# Patient Record
Sex: FEMALE | Race: WHITE
[De-identification: names, ages, dates, MRNs, and addresses within clinical notes are randomized per-mention and may not be internally consistent; named-entity substitution may affect disease eponyms.]

---

## 2017-08-01 ENCOUNTER — HOSPITAL ENCOUNTER (EMERGENCY)
Dept: HOSPITAL 22 - UTC | Age: 77
Discharge: HOME | End: 2017-08-01
Payer: MEDICARE

## 2017-08-01 VITALS — HEIGHT: 67 IN | BODY MASS INDEX: 32.49 KG/M2 | WEIGHT: 207 LBS

## 2017-08-01 VITALS — DIASTOLIC BLOOD PRESSURE: 67 MMHG | SYSTOLIC BLOOD PRESSURE: 128 MMHG

## 2017-08-01 DIAGNOSIS — I10: ICD-10-CM

## 2017-08-01 DIAGNOSIS — M79.651: Primary | ICD-10-CM

## 2017-08-01 NOTE — [facility] REPORT
History of Present Issue
Date/Time Seen by Provider 08/01/17 1957
Visit Reason
Pt arrived:Walked    
Presenting Problem:PT STATES HAVING TONYA HORSE TO OUTER LEFT THIGH ON 
WEDNESDAY. STATES GOING TO A RETREAT AND WALKING A LOT OVER THE WEEKEND WITHOUT 
DIFFICULTY. STATES TONYA HORSE LEFT A BRUISE TO HER THIGH THAT HAS NOW SPREAD 
DOWN HER LEG. STATES SWELLING TO LEG THAT BEGAN YESTERDAY.  
Location if Accident:
Onset of symptoms date/time:/ or onset unknown for:MEDICAL HX UNKNOWN
 
Have you (or family members/close friends) recently traveled outside the [place] 
[place]? N
If Yes, where/when: 
Have you had exposure to infectious disease within the past month?  TB? 
Other?  Specify: 
 
 
Patient states that she was at a walking retreat this weekend when she began to 
have pain and a tonya horse sensation in the top of her right thigh area 
states that next day it looked like a bruise. Sttes that she continued to walk 
throughout the weekend without any pain however the bruise appeared to moving 
down her leg into her knee area and then to her foot and her ankle area looks a 
little swollen today but no redness or temperature difference anywhere in the 
leg 
ALLERGIES
Coded Allergies:
Sulfa (Sulfonamide Antibiotics) (09/07/16)
ampicillin (09/07/16)
 
Home Medications
Active Scripts
CIPROFLOXACIN HCL (Ciprofloxacin HCl) 250 MG PO BID 
     #14 TAB 
     Prov:      10/07/16
 
Reported Medications
Aspirin (Adult Low Dose Aspirin EC) 81 MG PO DAILY 
DIGOXIN (Digox) 0.125 MG PO DAILY 
Rivaroxaban (Xarelto) 20 MG PO DAILY 
Metoprolol Tartrate (Metoprolol 25MG*****) 12.5 MG PO BID 
 
 
 
History
 
Medical History
General
   CAD? No
   Angina: No
   MI: No
   Hypertension? Yes
   Hyperlipidemia? No
   CHF? No
   DVT? No
   PE? No
   COPD? No
   Asthma? No
   Anemia? No
   GERD? No
   Gastric ulcers? No
   GI Bleed? No
   Hernia? No
   Thyroid Problems? No
   Hypothyroidism? No
   CVA? No
   Seizures? No
   Diabetes? No
   Renal Insuffiency? No
   UTI? No
   Stones? No
   BPH? No
   GB Disease: Yes
   Nephritic Syndrome? No
   Asplenia? No
   Hepatitis? No
   Sickle Cell Disease? No
   Arthritis? No
   Migraines? No
   Cataracts? No
   Glaucoma? No
   MRSA? No
   HIV? No
   TB? No
   Anxiety? No
   Depression? No
   Cancer? No
   More? Yes
   Additional hx:
AFIB, CYSTITIS
Immunization HX
   DT/Tetanus > 10 Years Ago
   Flu Refused
   Pneumonia Received In Past
Surgical Hx
Previous Surgery?Y  GALL BLADDER   Appendix   RIGHT KNEE REPLACEMENT   LEFT KNEE
REPLACEMENT
              
            
 
 
Family History
Family HX
   Diabetes Yes
   CAD Yes
   Hypertension Yes
   Hyperlipidemia Yes
   Cancer Yes
   TB No
 
Social History
Smoking Hx
   Smoker: Never Smoker
   Tobacco: No
Alcohol
   Alcohol: No
 
Review of Systems
All Other Systems Reviewed and Negative
 
Physical Exam
Vital Signs
 Vital Signs
 
 
Date Time Temp Pulse Resp B/P Pulse O2 O2 Flow FiO2
 
     Ox Delivery Rate 
 
08/01 1947 99.4 79 20 128/67 95   
 
 
General Appearance normal appearance, WD/WN, no apparent distress
Respiratory Status
Yes: trachea midline, chest symmetrical, non tender chest.  No: respiratory 
distress. 
Cardiovascular normal exam, regular rate/rhythm, no peripheral edema, no gallop
Extremities purplish looking contusion noted on right upper thigh area, knee and
lower calf and foot Patient states that bruising has traveled down the leg and 
denies injury Has history of varicose veins and spider veins. denies difficulty 
walking and thin shiny skin noted on lower extremities. Good pedal pulses, 
popiteal pulses, femoral pulses felt and good cap refill no redness, swollen 
areas or warmth noted to any area of skin
Neurologic alert, CNs II-XII nml as tested, normal exam, no motor/sensory 
deficits, oriented x 3
 
Medical Decision Making
 
LABS/Meds/Orders
Pt receiving controlled substance in ED? No
 
Departure
 
Departure
Time of Disposition 2025
Disposition DC Home or Self Care(routine)
Clinical Impression
Primary Impression: Hematoma
Condition STABLE
Referrals
Sukhdeep Ac MD (Family): [facility]
 
Patient Instructions DI for Hematoma (Bruise)
Additional Instructions
Call family doctor in the morning and see him tomorrow
If you began to have pain, redness, change of temp, swelling or any changes 
tonight return immediately to the ER
Return if needed
Discharge Counseling
   Counseled pt/family regarding diagnosis, home care, follow up needs
Comments
Patient given out pateint order for venous doppler and advised to see family 
doctor first thing in the morning. 
 
Electronically Signed by STEFANIE YORK on 08/01/17 at 2030

## 2017-08-04 NOTE — EXTERNAL MEDICAL SUMMARY RPT
[facility] Patient Summary
 Created on: 2017
 
MELISSA TOLLIVER
External Reference #: [id number]
: [birth year]
Sex: Female
 
Demographics
 
 
 
 Address  [address] [place] [address]
 
 Home Phone  [phone]
 
 Preferred Language  Unknown
 
 Marital Status  Unknown
 
 Voodoo Affiliation  Unknown
 
 Race  Unknown
 
 Ethnic Group  Unknown
 
 
Author
 
 
 
 Author  BOAL Heller, [employer] 
 
 Organization  [employer]
 
 Address  Unknown
 
 Phone  Unavailable
 
 
 
Results
 
 
 
 CBC W Auto Differential panel in Blood
 
 
 
 
 Observa  Value  Referen  Units  Interpr  Notes  Date
 
 tion   ce    etation  
 
   Range    
 
 
 
 
 Basophils  0 - 0.2  K/MM3  Normal  No   Aug 3 
 
       inform2017 
 
 [#/volume     on in   10:55 AM
 
 ] in      source  
 
 Blood by      data 
 
 Automated     
 
  count     
 
 Basophils  0.1 - 2.0  %  Normal  No   Aug 3 
 
 /      inform2017 
 
 leukocyte     on in   10:55 AM
 
 s in      source  
 
 Blood by      data 
 
 Automated     
 
  count     
 
 Eosinophi  0.0 - 0.4  K/mm3  Normal  No   Aug 3 
 
 ls      inform2017 
 
 [#/volume     on in   10:55 AM
 
 ] in      source  
 
 Blood by      data 
 
 Automated     
 
  count     
 
 Eosinophi  0.1 -   %  Normal  No   Aug 3 
 
 ls/100   12.0    inform2017 
 
 leukocyte     on in   10:55 AM
 
 s in      source  
 
 Blood by      data 
 
 Automated     
 
  count     
 
 Granulocy  1.8 - 7.8  K/mm3  Normal  No   Aug 3 
 
 jan      2017 
 
 [#/volume     on in   10:55 AM
 
 ] in      source  
 
 Blood by      data 
 
 Automated     
 
  count     
 
 Granulocy  37.0 -   %  Normal  No   Aug 3 
 
 jan/100   80.0    2017 
 
 leukocyte     on in   10:55 AM
 
 s in      source  
 
 Blood by      data 
 
 Automated     
 
  count     
 
 Hematocri  37.0 -   %  Normal  No   Aug 3 
 
 t [Volume  47.0    2017 
 
       on in   10:55 AM
 
 Fraction]     source  
 
  of Blood     data 
 
 Hemoglobi  12.2 -   g/dL  No   No   Aug 3 
 
 n   16.2   informati  informati   
 
 [Mass/vol    on in   on in   10:55 AM
 
 ume] in     source   source  
 
 Blood    data  data 
 
 Lymphocyt  0.7 - 4.5  K/mm3  Low  No   Aug 3 
 
 es      inform2017 
 
 [#/volume     on in   10:55 AM
 
 ] in      source  
 
 Unspecifi     data 
 
 ed      
 
 specimen      
 
 by      
 
 Automated     
 
  count     
 
 Lymphocyt  10 - 50.0  %  Normal  No   Aug 3 
 
 es      inform2017 
 
 [#/volume     on in   10:55 AM
 
 ] in      source  
 
 Unspecifi     data 
 
 ed      
 
 specimen      
 
 by      
 
 Automated     
 
  count     
 
 Erythrocy  27 - 31.2  pg  High  No   Aug 3 
 
 te mean      informati  2017 
 
 corpuscul     on in   10:55 AM
 
 ar      source  
 
 hemoglobi     data 
 
 n      
 
 [Entitic      
 
 mass]     
 
 Erythrocy  31.8 -   g/dl  Normal  No   Aug 3 
 
 te mean   35.4    2017 
 
 corpuscul     on in   10:55 AM
 
 ar      source  
 
 hemoglobi     data 
 
 n      
 
 concentra     
 
 tion      
 
 [Mass/vol     
 
 ume] by      
 
 Automated     
 
  count     
 
 Erythrocy  82.2 -   fl  High  No   Aug 3 
 
 te mean   97.8    2017 
 
 corpuscul     on in   10:55 AM
 
 ar volume     source  
 
  [Entitic     data 
 
  volume]      
 
 by      
 
 Automated     
 
  count     
 
 Monocytes  0.1 - 1.0  K/mm3  Normal  No   Aug 3 
 
       informati  2017 
 
 [#/volume     on in   10:55 AM
 
 ] in      source  
 
 Blood by      data 
 
 Automated     
 
  count     
 
 Monocytes  1.7 - 9.3  %  Normal  No   Aug 3 
 
 /100      2017 
 
 leukocyte     on in   10:55 AM
 
 s in      source  
 
 Blood by      data 
 
 Automated     
 
  count     
 
 Platelet   7.4 -   fl  High  No   Aug 3 
 
 mean   10.4    2017 
 
 volume      on in   10:55 AM
 
 [Entitic      source  
 
 volume]      data 
 
 in Blood      
 
 by      
 
 Automated     
 
  count     
 
 Platelets  142 - 424  K/mm3  Low  No   Aug 3 
 
       informati  2017 
 
 [#/volume     on in   10:55 AM
 
 ] in      source  
 
 Blood     data 
 
 Erythrocy  4.2 - 5.4  M/mm3  Low  No   Aug 3 
 
 jan      2017 
 
 [#/volume     on in   10:55 AM
 
 ] in      source  
 
 Amniotic      data 
 
 fluid     
 
 Erythrocy  11.5 -   %  Normal  No   Aug 3 
 
 te   17.5    2017 
 
 distribut     on in   10:55 AM
 
 ion width     source  
 
  [Entitic     data 
 
  volume]      
 
 by      
 
 Automated     
 
  count     
 
 Leukocyte  4.8 -   K/MM3  Low  No   Aug 3 
 
 s   10.8    2017 
 
 [#/volume     on in   10:55 AM
 
 ] in      source  
 
 Blood     data 
 
 
 
 
 Basic metabolic panel in Blood
 
 
 
 
 Observa  Value  Referen  Units  Interpr  Notes  Date
 
 tion   ce    etation  
 
   Range    
 
 
 
 
 Urea   7 - 18  mg/dL  Normal  No   Aug 3 
 
 nitrogen      2017 
 
 [Mass/vol     on in   10:55 AM
 
 ume] in      source  
 
 Serum or      data 
 
 Plasma     
 
 Calcium   8.5 -   mg/dL  Normal  No   Aug 3 
 
 [Mass/vol  10.1    2017 
 
 ume] in      on in   10:55 AM
 
 Serum or      source  
 
 Plasma     data 
 
 Chloride   98 - 107  mmoL/L  High  No   Aug 3 
 
 [Moles/vo     2017 
 
 lume] in      on in   10:55 AM
 
 Serum or      source  
 
 Plasma     data 
 
 Carbon   21.0 -   mmoL/L  Normal  No   Aug 3 
 
 dioxide,   32.0    informati   
 
 total      on in   10:55 AM
 
 [Moles/vo     source  
 
 lume] in      data 
 
 Serum or      
 
 Plasma     
 
 Creatinin  0.55 -   mg/dL  Normal  No   Aug 3 
 
 e   1.02    inform2017 
 
 [Mass/vol     on in   10:55 AM
 
 ume] in      source  
 
 Serum or      data 
 
 Plasma     
 
 Creatinin  50 - 200  ML/MIN  Normal  No   Aug 3 
 
 e renal      informati   
 
 clearance     on in   10:55 AM
 
       source  
 
 predicted     data 
 
  by      
 
 Cockcroft     
 
 -Gault      
 
 formula     
 
 Estimated  59-  ML/MIN  No   REFERENCE  Aug 3 
 
      informati   RANGE:   2017 
 
 glomerula    on in   >60   10:55 AM
 
 r     source   ML/MIN/1. 
 
 filtratio    data  73 SQUARE 
 
 n rate       METERSIf 
 
 (GF      this  
 
     patient  
 
     is  
 
     -A 
 
     merican,  
 
     then  
 
     multiply  
 
     theresult 
 
      by  
 
     1.210. 
 
 Glucose   74 - 106  mg/dL  High  No   Aug 3 
 
 [Mass/vol     informati   
 
 ume] in      on in   10:55 AM
 
 Serum or      source  
 
 Plasma     data 
 
 Potassium  3.5 - 5.1  mmoL/L  Normal  No   Aug 3 
 
       informati  2017 
 
 [Moles/vo     on in   10:55 AM
 
 lume] in      source  
 
 Serum or      data 
 
 Plasma     
 
 Sodium   136 - 145  mmoL/L  Normal  No   Aug 3 
 
 [Moles/vo     informati   
 
 lume] in      on in   10:55 AM
 
 Serum or      source  
 
 Plasma     data

## 2017-08-04 NOTE — EXTERNAL MEDICAL SUMMARY RPT
Patient Summary
 Created on: 2017
 
TOLLIVER MELISSA
: [birth year]
Sex: Female
 
Demographics
 
 
 
 Preferred Language  English
 
 Marital Status  Unknown
 
 Islam Affiliation  Unknown
 
 Race  Unknown
 
 Ethnic Group  Unknown
 
 
Author
 
 
 
 Author            ,            [employer]
 
 Address  Unknown
 
 Phone  [email or web address]
 
                                                                
 
Immunization
                                    Unable to retrieve immunization data due to 
connection failure with Immunization Registry. Please try again later.

## 2017-08-04 NOTE — EXTERNAL MEDICAL SUMMARY RPT
[facility] Patient Summary
 Created on: 2017
 
MELISSA TOLLIVER
External Reference #: [id number]
: [birth year]
Sex: Female
 
Demographics
 
 
 
 Address  [address] [place] [address]
 
 Home Phone  [phone]
 
 Preferred Language  Unknown
 
 Marital Status  Unknown
 
 Adventism Affiliation  Unknown
 
 Race  Unknown
 
 Ethnic Group  Unknown
 
 
Author
 
 
 
 Author  [employer], [employer] 
 
 Organization  [employer]
 
 Address  Unknown
 
 Phone  Unavailable
 
 
 
Results
 
 
 
 CBC W Auto Differential panel in Blood
 
 
 
 
 Observa  Value  Referen  Units  Interpr  Notes  Date
 
 tion   ce    etation  
 
   Range    
 
 
 
 
 Basophils  0 - 0.2  K/MM3  Normal  No   Aug 3 
 
       inform2017 
 
 [#/volume     on in   10:55 AM
 
 ] in      source  
 
 Blood by      data 
 
 Automated     
 
  count     
 
 Basophils  0.1 - 2.0  %  Normal  No   Aug 3 
 
 /      inform2017 
 
 leukocyte     on in   10:55 AM
 
 s in      source  
 
 Blood by      data 
 
 Automated     
 
  count     
 
 Eosinophi  0.0 - 0.4  K/mm3  Normal  No   Aug 3 
 
 ls      inform2017 
 
 [#/volume     on in   10:55 AM
 
 ] in      source  
 
 Blood by      data 
 
 Automated     
 
  count     
 
 Eosinophi  0.1 -   %  Normal  No   Aug 3 
 
 ls/100   12.0    inform2017 
 
 leukocyte     on in   10:55 AM
 
 s in      source  
 
 Blood by      data 
 
 Automated     
 
  count     
 
 Granulocy  1.8 - 7.8  K/mm3  Normal  No   Aug 3 
 
 jan      2017 
 
 [#/volume     on in   10:55 AM
 
 ] in      source  
 
 Blood by      data 
 
 Automated     
 
  count     
 
 Granulocy  37.0 -   %  Normal  No   Aug 3 
 
 jan/100   80.0    2017 
 
 leukocyte     on in   10:55 AM
 
 s in      source  
 
 Blood by      data 
 
 Automated     
 
  count     
 
 Hematocri  37.0 -   %  Normal  No   Aug 3 
 
 t [Volume  47.0    2017 
 
       on in   10:55 AM
 
 Fraction]     source  
 
  of Blood     data 
 
 Hemoglobi  12.2 -   g/dL  No   No   Aug 3 
 
 n   16.2   informati  informati   
 
 [Mass/vol    on in   on in   10:55 AM
 
 ume] in     source   source  
 
 Blood    data  data 
 
 Lymphocyt  0.7 - 4.5  K/mm3  Low  No   Aug 3 
 
 es      inform2017 
 
 [#/volume     on in   10:55 AM
 
 ] in      source  
 
 Unspecifi     data 
 
 ed      
 
 specimen      
 
 by      
 
 Automated     
 
  count     
 
 Lymphocyt  10 - 50.0  %  Normal  No   Aug 3 
 
 es      inform2017 
 
 [#/volume     on in   10:55 AM
 
 ] in      source  
 
 Unspecifi     data 
 
 ed      
 
 specimen      
 
 by      
 
 Automated     
 
  count     
 
 Erythrocy  27 - 31.2  pg  High  No   Aug 3 
 
 te mean      informati  2017 
 
 corpuscul     on in   10:55 AM
 
 ar      source  
 
 hemoglobi     data 
 
 n      
 
 [Entitic      
 
 mass]     
 
 Erythrocy  31.8 -   g/dl  Normal  No   Aug 3 
 
 te mean   35.4    2017 
 
 corpuscul     on in   10:55 AM
 
 ar      source  
 
 hemoglobi     data 
 
 n      
 
 concentra     
 
 tion      
 
 [Mass/vol     
 
 ume] by      
 
 Automated     
 
  count     
 
 Erythrocy  82.2 -   fl  High  No   Aug 3 
 
 te mean   97.8    2017 
 
 corpuscul     on in   10:55 AM
 
 ar volume     source  
 
  [Entitic     data 
 
  volume]      
 
 by      
 
 Automated     
 
  count     
 
 Monocytes  0.1 - 1.0  K/mm3  Normal  No   Aug 3 
 
       informati  2017 
 
 [#/volume     on in   10:55 AM
 
 ] in      source  
 
 Blood by      data 
 
 Automated     
 
  count     
 
 Monocytes  1.7 - 9.3  %  Normal  No   Aug 3 
 
 /100      2017 
 
 leukocyte     on in   10:55 AM
 
 s in      source  
 
 Blood by      data 
 
 Automated     
 
  count     
 
 Platelet   7.4 -   fl  High  No   Aug 3 
 
 mean   10.4    2017 
 
 volume      on in   10:55 AM
 
 [Entitic      source  
 
 volume]      data 
 
 in Blood      
 
 by      
 
 Automated     
 
  count     
 
 Platelets  142 - 424  K/mm3  Low  No   Aug 3 
 
       informati  2017 
 
 [#/volume     on in   10:55 AM
 
 ] in      source  
 
 Blood     data 
 
 Erythrocy  4.2 - 5.4  M/mm3  Low  No   Aug 3 
 
 jan      2017 
 
 [#/volume     on in   10:55 AM
 
 ] in      source  
 
 Amniotic      data 
 
 fluid     
 
 Erythrocy  11.5 -   %  Normal  No   Aug 3 
 
 te   17.5    2017 
 
 distribut     on in   10:55 AM
 
 ion width     source  
 
  [Entitic     data 
 
  volume]      
 
 by      
 
 Automated     
 
  count     
 
 Leukocyte  4.8 -   K/MM3  Low  No   Aug 3 
 
 s   10.8    2017 
 
 [#/volume     on in   10:55 AM
 
 ] in      source  
 
 Blood     data 
 
 
 
 
 Basic metabolic panel in Blood
 
 
 
 
 Observa  Value  Referen  Units  Interpr  Notes  Date
 
 tion   ce    etation  
 
   Range    
 
 
 
 
 Urea   7 - 18  mg/dL  Normal  No   Aug 3 
 
 nitrogen      2017 
 
 [Mass/vol     on in   10:55 AM
 
 ume] in      source  
 
 Serum or      data 
 
 Plasma     
 
 Calcium   8.5 -   mg/dL  Normal  No   Aug 3 
 
 [Mass/vol  10.1    2017 
 
 ume] in      on in   10:55 AM
 
 Serum or      source  
 
 Plasma     data 
 
 Chloride   98 - 107  mmoL/L  High  No   Aug 3 
 
 [Moles/vo     2017 
 
 lume] in      on in   10:55 AM
 
 Serum or      source  
 
 Plasma     data 
 
 Carbon   21.0 -   mmoL/L  Normal  No   Aug 3 
 
 dioxide,   32.0    informati   
 
 total      on in   10:55 AM
 
 [Moles/vo     source  
 
 lume] in      data 
 
 Serum or      
 
 Plasma     
 
 Creatinin  0.55 -   mg/dL  Normal  No   Aug 3 
 
 e   1.02    inform2017 
 
 [Mass/vol     on in   10:55 AM
 
 ume] in      source  
 
 Serum or      data 
 
 Plasma     
 
 Creatinin  50 - 200  ML/MIN  Normal  No   Aug 3 
 
 e renal      informati   
 
 clearance     on in   10:55 AM
 
       source  
 
 predicted     data 
 
  by      
 
 Cockcroft     
 
 -Gault      
 
 formula     
 
 Estimated  59-  ML/MIN  No   REFERENCE  Aug 3 
 
      informati   RANGE:   2017 
 
 glomerula    on in   >60   10:55 AM
 
 r     source   ML/MIN/1. 
 
 filtratio    data  73 SQUARE 
 
 n rate       METERSIf 
 
 (GF      this  
 
     patient  
 
     is  
 
     -A 
 
     merican,  
 
     then  
 
     multiply  
 
     theresult 
 
      by  
 
     1.210. 
 
 Glucose   74 - 106  mg/dL  High  No   Aug 3 
 
 [Mass/vol     informati   
 
 ume] in      on in   10:55 AM
 
 Serum or      source  
 
 Plasma     data 
 
 Potassium  3.5 - 5.1  mmoL/L  Normal  No   Aug 3 
 
       informati  2017 
 
 [Moles/vo     on in   10:55 AM
 
 lume] in      source  
 
 Serum or      data 
 
 Plasma     
 
 Sodium   136 - 145  mmoL/L  Normal  No   Aug 3 
 
 [Moles/vo     informati   
 
 lume] in      on in   10:55 AM
 
 Serum or      source  
 
 Plasma     data

## 2017-08-04 NOTE — EXTERNAL MEDICAL SUMMARY RPT
Patient Summary
 Created on: 2017
 
TOLLIVER MELISSA
: [birth year]
Sex: Female
 
Demographics
 
 
 
 Preferred Language  English
 
 Marital Status  Unknown
 
 Rastafarian Affiliation  Unknown
 
 Race  Unknown
 
 Ethnic Group  Unknown
 
 
Author
 
 
 
 Author            ,            [employer]
 
 Address  Unknown
 
 Phone  [email or web address]
 
                                                                
 
Immunization
                                    Unable to retrieve immunization data due to 
connection failure with Immunization Registry. Please try again later.

## 2017-08-04 NOTE — EXTERNAL MEDICAL SUMMARY RPT
Patient Summary
 Created on: 2017
 
TOLLIVERMELISSA
External Reference #: [id number]
: [birth year]
Sex: Female
 
Demographics
 
 
 
 Address  [address] [place] [address] [place] [facility]
 
 Home Phone  [phone]
 
 Preferred Language  Unknown
 
 Marital Status  Unknown
 
 Rastafarian Affiliation  Unknown
 
 Race  White
 
 Ethnic Group  Unknown
 
 
Author
 
 
 
 Author            ,            BOLA [employer]
 
 Address  Unknown
 
 Phone  [email or web address]
 
 
 
Support
 
 
 
 Name  Relationship  Address  Phone
 
 BACK,            Next Of Kin  [address]   +1 
 
   YADIRA NICOLAS   [phone]
 
   [facility]  
 
   [facility] 
 
                                            
 
Purpose
                      Continuity of Care Document - 2013 through 2017                                                                            
                     
 
Problems
                      
 
 
 Code         Diagnosis    DOS          Provider     Status     
 
                                                               
 
               
 
 E04.1        NONTOXIC                                        
 
              SINGLE                                        
 
    THYROID                                        
 
  NODULE                  
 
                
 
       
 
                                                                                
                 
 
Allergies, Adverse Reactions, Alerts
                      
 
 
 Type                
 
 Drug Allergy                
 
            Adverse Reaction to Substance            
 
 
 Substance              Reaction               Severity             
 
                   
 
 Ampicillin             I-HIVES                Intermediate         
 
                       
 
                                                                                
                 
 
Medications
                      
 
 
 Na  ND  Rx  Da  Fi  Fi  Am  Da  Di  Ph  RX  Ph  St
 
 me  C   No  te  ll  ll  ou  ys  ag  ar   #  ys  at
 
         rm        s   nt      no  ma      ic  us
 
             Or  Da              si  cy      ia    
 
             de  te              s           n     
 
             re                                    
 
             d                                     
 
                                                   
 
                                                   
 
                                                   
 
                                                  
 
                                   
 
                           
 
                      
 
               
 
              
 
 Sa  63          11          0                   No
 
 li  80          -1                               
 
 ne  70          8-                              Lo
 
    10          20                              ng
 
 Fl  07          13                              er
 
 us  5                                            
 
 h                                               Ac
 
 10                                              ti
 
 ML                                              ve
 
                                                
 
 Sy                                          
 
 ri                                          
 
 ng                                          
 
 e                                           
 
                                         
 
                                       
 
                                       
 
                                    
 
               
 
               
 
               
 
               
 
              
 
 KE  00          11          0                   No
 
 TO  40          -1                               
 
 RO  93          8-                              Lo
 
 LA  79          20                              ng
 
 C   50          13                              er
 
 30  1                                            
 
                                                Ac
 
 MG                                              ti
 
 /M                                              ve
 
 L                                               
 
 VI                                          
 
 AL                                          
 
                                             
 
                                             
 
                                         
 
                                       
 
                                       
 
                                    
 
               
 
               
 
               
 
 AC  51          11          0                   No
 
 ET  07          -1                               
 
 AM  90          8-                              Lo
 
 IN  16          20                              ng
 
 OP  19          13                              er
 
 HE  9H                                           
 
 N                                               Ac
 
 W/                                              ti
 
 CO                                              ve
 
 DE                                              
 
 IN                                          
 
 E                                           
 
 #3                                          
 
                                            
 
 TA                                       
 
 K                                     
 
                                       
 
                                    
 
               
 
               
 
               
 
               
 
               
 
               
 
              
 
 CE  00          11          0                   No
 
 FT  40          -1                               
 
 RI  97          8-                              Lo
 
 AX  33          20                              ng
 
 ON  30          13                              er
 
 E   4                                            
 
 1                                               Ac
 
 GM                                              ti
 
                                                ve
 
 VI                                              
 
 AL                                          
 
                                             
 
                                             
 
                                             
 
                                         
 
                                       
 
                                       
 
                                    
 
               
 
               
 
 SO  00          11          0                   No
 
 DI  40          -1                               
 
 UM  97          8-                              Lo
 
    10          20                              ng
 
 CH  16          13                              er
 
 LO  6                                            
 
 RI                                              Ac
 
 DE                                              ti
 
                                                ve
 
 0.                                              
 
 9%                                          
 
                                            
 
 SO                                          
 
 LN                                          
 
                                         
 
                                       
 
                                       
 
                                    
 
               
 
               
 
               
 
               
 
               
 
                                                                                
                                                                   
 
Vital Signs
                      2013 05:43            
 
 
 Name         Value        Interpretat  Reference   Comment    
 
                           ion          Range                   
 
                               
 
      
 
 BP   79 mm[Hg]                                         
 
 Diastolic                                                      
 
                                                          
 
                    
 
 BP Systolic  144 mm[Hg]                                        
 
                                                                
 
                                                       
 
              
 
 Heart   68 /min                                           
 
 Rate/Pulse                                                     
 
                                                        
 
                     
 
 O2%          96 %                                              
 
                                                               
 
                                        
 
              
 
 Respiratory  20 /min                                           
 
  Rate                                                          
 
                                                        
 
                
 
            2013 05:11            
 
 
 Name         Value        Interpretat  Reference   Comment    
 
                           ion          Range                   
 
                               
 
      
 
 BP   73 mm[Hg]                                         
 
 Diastolic                                                      
 
                                                          
 
                    
 
 BP Systolic  146 mm[Hg]                                        
 
                                                                
 
                                                       
 
              
 
 Heart   59 /min                                           
 
 Rate/Pulse                                                     
 
                                                        
 
                     
 
 O2%          97 %                                              
 
                                                               
 
                                        
 
              
 
 Respiratory  20 /min                                           
 
  Rate                                                          
 
                                                        
 
                
 
                                                                                
                           
 
Results
                      
 
 
 Labs                
 
 
 
 
 Lab   Lab   Date    Result  Refere  Interp  Status  Commen
 
 Order   Detail                  nces   retati          t     
 
                                 Range   on                    
 
                                                            
 
                                  
 
                
 
 
 
 
 Phosphate SerPl-mCnc (2017 13:12)
 
                 
 
 
 
 
          Phospha  -2  3.5   2.5-4.5           complet
 
          te   017   mg/dL                      ed     
 
        SerPl-m  13:12                                      
 
  Cnc                                           
 
                                 
 
                   
 
 
 
 
 Ca-I SerPl ISE-sCnc (2017 13:12)
 
                 
 
 
 
 
          Ca-I   -2  5.0   4.6-5.3           complet
 
          SerPl   017   mg/dL                      ed     
 
        ISE-sCn  13:12                                      
 
  c                                             
 
                                 
 
                 
 
 
 
 
 Ca-I SerPl ISE-sCnc (2017 14:39)
 
                 
 
 
 
 
          Ca-I   --2  4.5   4.6-5.1           complet
 
          SerPl   017   mg/dL                      ed     
 
        ISE-sCn  14:39                                      
 
  c                                             
 
                                 
 
                 
 
 
 
 
 Phosphate SerPl-mCnc (2017 04:08)
 
                 
 
 
 
 
          Phospha  --2  3.7   2.5-4.5           complet
 
          te   017   mg/dL                      ed     
 
        SerPl-m  04:08                                      
 
  Cnc                                           
 
                                 
 
                   
 
 
 
 
 Ca-I SerPl ISE-sCnc (2017 04:08)
 
                 
 
 
 
 
          Ca-I   --2  4.3   4.6-5.1           complet
 
          SerPl   017   mg/dL                      ed     
 
        ISE-sCn  04:08                                      
 
  c                                             
 
                                 
 
                 
 
 
 
 
 Magnesium SerPl-mCnc (2017 04:08)
 
                 
 
 
 
 
          Magnesi  -2  2.0   1.9-2.4           complet
 
          um   017   mg/dL                      ed     
 
        SerPl-m  04:08                                      
 
  Cnc                                           
 
                                 
 
                   
 
 
 
 
 Phosphate SerPl-mCnc (2017 16:43)
 
                 
 
 
 
 
          Phospha  -2  3.7   2.5-4.5           complet
 
          te   017   mg/dL                      ed     
 
        SerPl-m  16:43                                      
 
  Cnc                                           
 
                                 
 
                   
 
 
 
 
 Magnesium SerPl-mCnc (2017 16:43)
 
                 
 
 
 
 
          Magnesi  --2  1.8   1.9-2.4           complet
 
          um   017   mg/dL                      ed     
 
        SerPl-m  16:43                                      
 
  Cnc                                           
 
                                 
 
                   
 
 
 
 
 Ca-I SerPl ISE-sCnc (2017 16:43)
 
                 
 
 
 
 
          Ca-I   06--2  4.4   4.6-5.1           complet
 
          SerPl   017   mg/dL                      ed     
 
        ISE-sCn  16:43                                      
 
  c                                             
 
                                 
 
                 
 
 
 
 
 BASIC METABOLIC PANEL (2013 05:05)
 
                 
 
 
 
 
          Glucose    110               complet
 
             013   mg/dL                      ed     
 
        Bld-mCn  05:05                                      
 
  c                                            
 
                                 
 
                 
 
          BUN   11-18-2  17   7-18              complet
 
          Bld-mCn  013   mg/dL                      ed     
 
        c        05:05                                      
 
                                             
 
                               
 
           
 
          Creat   11-18-2  1.0   0.6-1.0           complet
 
          SerPl-m  013   mg/dL                      ed     
 
        Cnc      05:05                                      
 
                                                
 
                                 
 
           
 
          ESTIMAT  11-18-2  74               complet
 
          ED   013   ML/MIN                     ed     
 
        CREATIN  05:05                                      
 
  INE                                       
 
  CLEARAN                         
 
  CE               
 
              
 
             
 
          GFR   11-18-2  55   59-               complet
 
          (ESTIMA  013   ML/MIN                     ed     
 
        BOWEN)     05:05                                      
 
                                           
 
                                  
 
             
 
          Sodium   11-18-2  141   136-145           complet
 
          SerPl-s  013   mmoL/L                     ed     
 
        Cnc      05:05                                      
 
                                                
 
                                  
 
           
 
          Potassi  11-18-2  4.1   3.5-5.1           complet
 
          um   013   mmoL/L                     ed     
 
        SerPl-s  05:05                                      
 
  Cnc                                           
 
                                  
 
                   
 
          Chlorid  11-18-2  104               complet
 
          e   013   mmoL/L                     ed     
 
        SerPl-s  05:05                                      
 
  Cnc                                          
 
                                  
 
                   
 
          CO2   11-18-2  29   21.0-32           complet
 
          SerPl-s  013   mmoL/L   .0                ed     
 
        Cnc      05:05                                      
 
                                                  
 
                                  
 
           
 
          Calcium  11-18-2  8.9   8.5-10.           complet
 
             013   mg/dL    1                 ed     
 
        SerPl-m  05:05                                      
 
  Cnc                                            
 
                                 
 
                   
 
 
 
 
 CBC with AUTO DIFF (2013 05:05)
 
                 
 
 
 
 
          WBC #   11-18-2  9.1   4.8-10.           complet
 
          Bld   013   K/MM3    8                 ed     
 
        Auto     05:05                                      
 
                                                 
 
                                 
 
             
 
          RBC #   11-18-2  4.52   4.2-5.4           complet
 
          Bld   013   M/mm3                      ed     
 
        Auto     05:05                                      
 
                                                
 
                                 
 
             
 
          Hgb   11-18-2  15.5   12.2-16           complet
 
          Bld-mCn  013   g/dL     .2                ed     
 
        c        05:05                                      
 
                                                  
 
                              
 
           
 
          Hct Fr   11-18-2  44.4 %   37.0-47           complet
 
          Bld      013            .0                ed     
 
                 05:05                                      
 
                                              
 
                      
 
           
 
          MCV RBC  11-18-2  98.3 fl  82.2-97           complet
 
                   013            .8                ed     
 
                 05:05                                      
 
                                            
 
                      
 
           
 
          MCH RBC  11-18-2  34.3 pg  27-31.2           complet
 
           Qn   013                              ed     
 
        Auto     05:05                                      
 
                                             
 
                              
 
             
 
          MEAN   11-18-2  34.9   31.8-35           complet
 
          CORPUSC  013   g/dl     .4                ed     
 
        ULAR   05:05                                      
 
  HGB                                          
 
  CONC                          
 
                   
 
              
 
        
 
          RDW RBC  11-18-2  14.6 %   11.5-17           complet
 
           Auto    013            .5                ed     
 
                 05:05                                      
 
                                              
 
                         
 
           
 
          Platele  11-18-2  119   142-424           complet
 
          t Bld   013   K/mm3                      ed     
 
        Ql   05:05                                      
 
  Manual                                        
 
                                 
 
                   
 
          
 
          MEAN   11-18-2  8.7 fl   7.4-10.           complet
 
          PLATELE  013            4                 ed     
 
        T   05:05                                      
 
  VOLUME                                     
 
                              
 
                   
 
          
 
          Granulo  11-18-2  78.8 %   37.0-80           complet
 
          cytes   013            .0                ed     
 
        Fr Bld   05:05                                      
 
  Auto                                        
 
                              
 
                   
 
        
 
          LYMPH %  11-18-2  13.3 %   10-50.0           complet
 
                   013                              ed     
 
                 05:05                                      
 
                                         
 
                      
 
           
 
          Monocyt  11-18-2  6.0 %    1.7-9.3           complet
 
          es Fr   013                              ed     
 
        Bld   05:05                                      
 
  Auto                                     
 
                              
 
                   
 
        
 
          Eosinop  11-18-2  1.6 %    0.1-12.           complet
 
          hil Fr   013            0                 ed     
 
        Bld   05:05                                      
 
  Auto                                      
 
                              
 
                   
 
        
 
          Basophi  11-18-2  0.4 %    0.1-2.0           complet
 
          ls Fr   013                              ed     
 
        Bld   05:05                                      
 
  Auto                                     
 
                              
 
                   
 
        
 
          Granulo  11-18-2  7.2   1.8-7.8           complet
 
          cytes #  013   K/mm3                      ed     
 
         Bld   05:05                                      
 
  Auto                                          
 
                                 
 
                   
 
        
 
          Lymphoc  11-18-2  1.2   0.7-4.5           complet
 
          ytes Fr  013   K/mm3                      ed     
 
         Bld   05:05                                      
 
  Auto                                          
 
                                 
 
                   
 
        
 
          Monocyt  11-18-2  0.5   0.1-1.0           complet
 
          es #   013   K/mm3                      ed     
 
        Bld   05:05                                      
 
  Auto                                          
 
                                 
 
                   
 
        
 
          Eosinop  11-18-2  0.1   0.0-0.4           complet
 
          hil #   013   K/mm3                      ed     
 
        Bld   05:05                                      
 
  Auto                                          
 
                                 
 
                   
 
        
 
          Basophi  11-18-2  0.0   0-0.2             complet
 
          ls #   013   K/MM3                      ed     
 
        Bld   05:05                                      
 
  Auto                                        
 
                                 
 
                   
 
        
 
 
 
 
 URINALYSIS/COMPLETE (2013 04:35)
 
                 
 
 
 
 
          URINE   11-18-2  ORANGE   YELLOW            complet
 
          COLOR    013                              ed     
 
                 04:35                                      
 
                                           
 
                         
 
           
 
          URINE   11-18-2  CLOUDY   CLEAR             complet
 
          APPEARA  013                              ed     
 
        NCE      04:35                                      
 
                                          
 
                              
 
           
 
          URINE   11-18-2  TRACE    NEG               complet
 
          GLUCOSE  013                              ed     
 
         -   04:35                                      
 
  DIPSTIC                             
 
  K                           
 
                   
 
            
 
          URINE   11-18-2  NEGATIV  NEG               complet
 
          BILIRUB  013   E                          ed     
 
        IN -   04:35                                      
 
  DIPSTIC                                
 
  K                           
 
                   
 
            
 
          URINE   11-18-2  NEGATIV  NEG               complet
 
          KETONE   013   E mg/dL                    ed     
 
                 04:35                                      
 
                                           
 
                               
 
           
 
          URINE   11-18-2  1.010   1.005-1           complet
 
          SPECIFI  013   UNK      .030              ed     
 
        C   04:35                                      
 
  GRAVITY                                          
 
                                
 
                   
 
           
 
          URINE   11-18-2  2+       NEG               complet
 
          BLOOD    013                              ed     
 
                 04:35                                     
 
                                   
 
                         
 
           
 
          URINE   11-18-2  7.0 UNK  5.0-8.5           complet
 
          PH       013                              ed     
 
                 04:35                                      
 
                                            
 
                      
 
           
 
          URINE   18-2  2+   NEG               complet
 
          PROTEIN  013   mg/dL                      ed     
 
         -   04:35                                      
 
  DIPSTIC                                  
 
  K                              
 
                   
 
            
 
          URINE   -18-2  2.0   NEG               complet
 
          UROBILI  013   E.U./dL                    ed     
 
        NOGEN -  04:35                                      
 
                                     
 
  DIPSTIC                          
 
  K                
 
              
 
            
 
          URINE   18-2  POSITIV  NEG               complet
 
          NITRATE  013   E                          ed     
 
         -   04:35                                      
 
  DIPSTIC                                
 
  K                           
 
                   
 
            
 
          URINE   -2  3+       NEG               complet
 
          LEUK   013                              ed     
 
        ESTERAS  04:35                                     
 
  E                                
 
                              
 
                 
 
          URINE   -2  10-20   0                 complet
 
          RBC      013   rbc/hpf                    ed     
 
                 04:35                                    
 
                                           
 
                           
 
           
 
          URINE   18-2  TNTC   O                 complet
 
          WBC      013   wbc/hpf                    ed     
 
                 04:35                                    
 
                                           
 
                           
 
           
 
                                                                                
                                                                                
                                                                                
                                                                                
                                                                                
                                                                                
                                                                                
                                                   
 
Encounters
                      
 
 
 Encounter  Start   End Date   Code       Location   Performer
 
  Type      Date                                                 
 
                                                        
 
                
 
 Emergency    ERIK Rodas MD
 
 (ER)       3 04:42    3 05:44               [facility]

## 2017-12-06 ENCOUNTER — HOSPITAL ENCOUNTER (OUTPATIENT)
Dept: HOSPITAL 22 - RAD | Age: 77
End: 2017-12-06
Attending: OBSTETRICS & GYNECOLOGY
Payer: MEDICARE

## 2017-12-06 DIAGNOSIS — Z12.31: Primary | ICD-10-CM

## 2017-12-06 PROCEDURE — G0202 SCR MAMMO BI INCL CAD: HCPCS

## 2017-12-08 NOTE — RADIOLOGY REPORT PS360
DIG MAMM-SCREEN CHARLENE W/CAD
CAD Screening 
 
COMPARISON:  Digital mammograms 7/12/2016 and 11/12/2014
 
INDICATION:  There is no personal or family history of breast cancer
 
TECHNIQUE:  Standard CC and MLO images were obtained.  R2 CAD reviewed.  
 
FINDINGS:  Diffuse fibroglandular densities are seen throughout both breasts.
Again noted are multiple benign-appearing micro and macrocalcifications in each
breast. There is no suspicious lesion and there are no suspicious
microcalcifications.
 
 
 
IMPRESSION: Stable exam with no suspicious lesion seen recommend yearly
follow-up 
 
 
BI-RADS CATEGORY: 2_Benign
 
RECOMMENDED FOLLOWUP: 12M 12 MONTH FOLLOW-UP
 
(A letter has been sent to the patient regarding results of the study.)

## 2018-09-12 ENCOUNTER — HOSPITAL ENCOUNTER (OUTPATIENT)
Age: 78
End: 2018-09-12
Payer: MEDICARE

## 2018-09-12 DIAGNOSIS — C73: Primary | ICD-10-CM

## 2018-09-12 DIAGNOSIS — I48.0: ICD-10-CM

## 2018-09-12 DIAGNOSIS — I11.9: ICD-10-CM

## 2018-09-12 DIAGNOSIS — R60.9: ICD-10-CM

## 2018-09-12 DIAGNOSIS — E78.2: ICD-10-CM

## 2018-09-12 DIAGNOSIS — I10: ICD-10-CM

## 2018-09-12 DIAGNOSIS — I25.10: ICD-10-CM

## 2018-09-12 LAB
ANION GAP SERPL CALC-SCNC: 11.8 MEQ/L (ref 5–15)
BUN SERPL-MCNC: 11 MG/DL (ref 7–18)
CALCIUM SPEC-MCNC: 8.9 MG/DL (ref 8.5–10.1)
CHLORIDE SPEC-SCNC: 108 MMOL/L (ref 98–107)
CO2 SERPL-SCNC: 30 MMOL/L (ref 21–32)
CREAT BLD-SCNC: 0.76 MG/DL (ref 0.55–1.02)
ESTIMATED GLOMERULAR FILT RATE: 74 ML/MIN (ref 60–?)
GFR (AFRICAN AMERICAN): 90 ML/MIN (ref 60–?)
GLUCOSE: 102 MG/DL (ref 74–106)
POTASSIUM: 3.8 MMOL/L (ref 3.5–5.1)
SODIUM SPEC-SCNC: 146 MMOL/L (ref 136–145)

## 2018-09-12 PROCEDURE — 36415 COLL VENOUS BLD VENIPUNCTURE: CPT

## 2018-09-12 PROCEDURE — 80048 BASIC METABOLIC PNL TOTAL CA: CPT

## 2018-11-09 ENCOUNTER — HOSPITAL ENCOUNTER (OUTPATIENT)
Age: 78
End: 2018-11-09
Payer: MEDICARE

## 2018-11-09 DIAGNOSIS — R60.9: ICD-10-CM

## 2018-11-09 DIAGNOSIS — Z86.73: ICD-10-CM

## 2018-11-09 DIAGNOSIS — I10: ICD-10-CM

## 2018-11-09 DIAGNOSIS — R41.3: ICD-10-CM

## 2018-11-09 DIAGNOSIS — R94.31: ICD-10-CM

## 2018-11-09 DIAGNOSIS — E78.2: Primary | ICD-10-CM

## 2018-11-09 DIAGNOSIS — I11.9: ICD-10-CM

## 2018-11-09 DIAGNOSIS — I25.10: ICD-10-CM

## 2018-11-09 DIAGNOSIS — I48.0: ICD-10-CM

## 2018-11-09 PROCEDURE — 93306 TTE W/DOPPLER COMPLETE: CPT

## 2018-11-19 ENCOUNTER — HOSPITAL ENCOUNTER (OUTPATIENT)
Age: 78
End: 2018-11-19
Payer: MEDICARE

## 2018-11-19 DIAGNOSIS — R53.83: Primary | ICD-10-CM

## 2018-11-19 DIAGNOSIS — I48.91: ICD-10-CM

## 2018-11-19 DIAGNOSIS — I10: ICD-10-CM

## 2018-11-19 LAB
ANION GAP SERPL CALC-SCNC: 11.8 MEQ/L (ref 5–15)
BUN SERPL-MCNC: 16 MG/DL (ref 7–18)
CALCIUM SPEC-MCNC: 9.1 MG/DL (ref 8.5–10.1)
CHLORIDE SPEC-SCNC: 104 MMOL/L (ref 98–107)
CO2 SERPL-SCNC: 30 MMOL/L (ref 21–32)
CREAT BLD-SCNC: 0.75 MG/DL (ref 0.55–1.02)
DIGOXIN SERPL-MCNC: 1.01 NG/ML (ref 1.15–2.56)
ESTIMATED GLOMERULAR FILT RATE: 75 ML/MIN (ref 60–?)
GFR (AFRICAN AMERICAN): 91 ML/MIN (ref 60–?)
GLUCOSE: 98 MG/DL (ref 74–106)
HCT VFR BLD CALC: 45.4 % (ref 37–47)
HGB BLD-MCNC: 15.1 G/DL (ref 12.2–16.2)
MAGNESIUM: 1.8 MG/DL (ref 1.4–2.2)
MCHC RBC-ENTMCNC: 33.4 G/DL (ref 31.8–35.4)
MCV RBC: 104 FL (ref 81–99)
MEAN CORPUSCULAR HEMOGLOBIN: 34.7 PG (ref 27–31.2)
PLATELET # BLD: 104 K/MM3 (ref 142–424)
POTASSIUM: 3.8 MMOL/L (ref 3.5–5.1)
RBC # BLD AUTO: 4.36 M/MM3 (ref 4.2–5.4)
SODIUM SPEC-SCNC: 142 MMOL/L (ref 136–145)
WBC # BLD AUTO: 6 K/MM3 (ref 4.8–10.8)

## 2018-11-19 PROCEDURE — 83735 ASSAY OF MAGNESIUM: CPT

## 2018-11-19 PROCEDURE — 85025 COMPLETE CBC W/AUTO DIFF WBC: CPT

## 2018-11-19 PROCEDURE — 80162 ASSAY OF DIGOXIN TOTAL: CPT

## 2018-11-19 PROCEDURE — 36415 COLL VENOUS BLD VENIPUNCTURE: CPT

## 2018-11-19 PROCEDURE — 80048 BASIC METABOLIC PNL TOTAL CA: CPT

## 2018-12-10 ENCOUNTER — HOSPITAL ENCOUNTER (OUTPATIENT)
Age: 78
End: 2018-12-10
Payer: MEDICARE

## 2018-12-10 DIAGNOSIS — I63.9: Primary | ICD-10-CM

## 2018-12-10 DIAGNOSIS — R41.3: ICD-10-CM

## 2018-12-10 DIAGNOSIS — I48.91: ICD-10-CM

## 2018-12-10 DIAGNOSIS — I63.9: ICD-10-CM

## 2018-12-10 PROCEDURE — 82607 VITAMIN B-12: CPT

## 2018-12-10 PROCEDURE — 82746 ASSAY OF FOLIC ACID SERUM: CPT

## 2018-12-10 PROCEDURE — 36415 COLL VENOUS BLD VENIPUNCTURE: CPT

## 2018-12-10 PROCEDURE — 94762 N-INVAS EAR/PLS OXIMTRY CONT: CPT

## 2018-12-12 LAB
FOLATE: 3.4 NG/ML (ref 3–?)
VITAMIN B12: 547 PG/ML (ref 232–1245)

## 2018-12-18 ENCOUNTER — HOSPITAL ENCOUNTER (OUTPATIENT)
Age: 78
End: 2018-12-18
Payer: MEDICARE

## 2018-12-18 DIAGNOSIS — I11.9: ICD-10-CM

## 2018-12-18 DIAGNOSIS — I25.10: Primary | ICD-10-CM

## 2018-12-18 DIAGNOSIS — I48.2: ICD-10-CM

## 2018-12-18 LAB
ANION GAP SERPL CALC-SCNC: 12.1 MEQ/L (ref 5–15)
BUN SERPL-MCNC: 8 MG/DL (ref 7–18)
CALCIUM SPEC-MCNC: 8.7 MG/DL (ref 8.5–10.1)
CHLORIDE SPEC-SCNC: 106 MMOL/L (ref 98–107)
CO2 SERPL-SCNC: 29 MMOL/L (ref 21–32)
CREAT BLD-SCNC: 0.7 MG/DL (ref 0.55–1.02)
ESTIMATED GLOMERULAR FILT RATE: 81 ML/MIN (ref 60–?)
GFR (AFRICAN AMERICAN): 98 ML/MIN (ref 60–?)
GLUCOSE: 116 MG/DL (ref 74–106)
HCT VFR BLD CALC: 45.1 % (ref 37–47)
HGB BLD-MCNC: 14.9 G/DL (ref 12.2–16.2)
MCHC RBC-ENTMCNC: 33.1 G/DL (ref 31.8–35.4)
MCV RBC: 104.9 FL (ref 81–99)
MEAN CORPUSCULAR HEMOGLOBIN: 34.7 PG (ref 27–31.2)
PLATELET # BLD: 103 K/MM3 (ref 142–424)
POTASSIUM: 4.1 MMOL/L (ref 3.5–5.1)
RBC # BLD AUTO: 4.3 M/MM3 (ref 4.2–5.4)
SODIUM SPEC-SCNC: 143 MMOL/L (ref 136–145)
WBC # BLD AUTO: 5.2 K/MM3 (ref 4.8–10.8)

## 2018-12-18 PROCEDURE — 85025 COMPLETE CBC W/AUTO DIFF WBC: CPT

## 2018-12-18 PROCEDURE — 80048 BASIC METABOLIC PNL TOTAL CA: CPT

## 2018-12-18 PROCEDURE — 36415 COLL VENOUS BLD VENIPUNCTURE: CPT

## 2019-02-13 ENCOUNTER — HOSPITAL ENCOUNTER (OUTPATIENT)
Age: 79
End: 2019-02-13
Payer: MEDICARE

## 2019-02-13 DIAGNOSIS — I25.10: ICD-10-CM

## 2019-02-13 DIAGNOSIS — I11.9: ICD-10-CM

## 2019-02-13 DIAGNOSIS — I48.0: ICD-10-CM

## 2019-02-13 DIAGNOSIS — Z79.01: ICD-10-CM

## 2019-02-13 DIAGNOSIS — E78.2: ICD-10-CM

## 2019-02-13 PROCEDURE — 93306 TTE W/DOPPLER COMPLETE: CPT

## 2019-05-14 ENCOUNTER — HOSPITAL ENCOUNTER (OUTPATIENT)
Age: 79
End: 2019-05-14
Payer: MEDICARE

## 2019-05-14 DIAGNOSIS — C73: Primary | ICD-10-CM

## 2019-05-14 LAB
T4 FREE SERPL-MCNC: 1.35 NG/DL (ref 0.76–1.46)
TSH SERPL-ACNC: 0.07 UIU/ML (ref 0.36–3.74)

## 2019-05-14 PROCEDURE — 84439 ASSAY OF FREE THYROXINE: CPT

## 2019-05-14 PROCEDURE — 84443 ASSAY THYROID STIM HORMONE: CPT

## 2019-05-14 PROCEDURE — 36415 COLL VENOUS BLD VENIPUNCTURE: CPT

## 2019-08-08 ENCOUNTER — HOSPITAL ENCOUNTER (OUTPATIENT)
Age: 79
End: 2019-08-08
Payer: MEDICARE

## 2019-08-08 DIAGNOSIS — C73: Primary | ICD-10-CM

## 2019-08-08 LAB
T4 FREE SERPL-MCNC: 1.25 NG/DL (ref 0.76–1.46)
TSH SERPL-ACNC: 0.29 UIU/ML (ref 0.36–3.74)

## 2019-08-08 PROCEDURE — 36415 COLL VENOUS BLD VENIPUNCTURE: CPT

## 2019-08-08 PROCEDURE — 84443 ASSAY THYROID STIM HORMONE: CPT

## 2019-08-08 PROCEDURE — 84439 ASSAY OF FREE THYROXINE: CPT

## 2019-09-04 ENCOUNTER — HOSPITAL ENCOUNTER (OUTPATIENT)
Age: 79
End: 2019-09-04
Payer: MEDICARE

## 2019-09-04 DIAGNOSIS — I25.10: ICD-10-CM

## 2019-09-04 DIAGNOSIS — I10: ICD-10-CM

## 2019-09-04 DIAGNOSIS — I63.9: ICD-10-CM

## 2019-09-04 DIAGNOSIS — E78.2: ICD-10-CM

## 2019-09-04 DIAGNOSIS — R42: ICD-10-CM

## 2019-09-04 DIAGNOSIS — I48.0: ICD-10-CM

## 2019-09-04 DIAGNOSIS — I48.2: ICD-10-CM

## 2019-09-04 DIAGNOSIS — I11.9: ICD-10-CM

## 2019-09-04 DIAGNOSIS — C73: Primary | ICD-10-CM

## 2019-09-04 LAB
ANION GAP SERPL CALC-SCNC: 14.3 MEQ/L (ref 5–15)
BUN SERPL-MCNC: 14 MG/DL (ref 7–18)
CALCIUM SPEC-MCNC: 9.3 MG/DL (ref 8.5–10.1)
CHLORIDE SPEC-SCNC: 104 MMOL/L (ref 98–107)
CO2 SERPL-SCNC: 27 MMOL/L (ref 21–32)
CREAT BLD-SCNC: 0.78 MG/DL (ref 0.55–1.02)
ESTIMATED GLOMERULAR FILT RATE: 72 ML/MIN (ref 60–?)
GFR (AFRICAN AMERICAN): 87 ML/MIN (ref 60–?)
GLUCOSE: 116 MG/DL (ref 74–106)
POTASSIUM: 4.3 MMOL/L (ref 3.5–5.1)
SODIUM SPEC-SCNC: 141 MMOL/L (ref 136–145)

## 2019-09-04 PROCEDURE — 80048 BASIC METABOLIC PNL TOTAL CA: CPT

## 2019-09-04 PROCEDURE — 36415 COLL VENOUS BLD VENIPUNCTURE: CPT

## 2019-09-25 ENCOUNTER — HOSPITAL ENCOUNTER (OUTPATIENT)
Age: 79
End: 2019-09-25
Payer: MEDICARE

## 2019-09-25 DIAGNOSIS — I48.2: ICD-10-CM

## 2019-09-25 DIAGNOSIS — I48.91: ICD-10-CM

## 2019-09-25 DIAGNOSIS — R07.9: ICD-10-CM

## 2019-09-25 DIAGNOSIS — R07.89: ICD-10-CM

## 2019-09-25 DIAGNOSIS — I25.10: ICD-10-CM

## 2019-09-25 DIAGNOSIS — I11.9: ICD-10-CM

## 2019-09-25 DIAGNOSIS — E78.5: Primary | ICD-10-CM

## 2019-09-25 DIAGNOSIS — R10.9: ICD-10-CM

## 2019-09-25 LAB
ALBUMIN LEVEL: 4 GM/DL (ref 3.4–5)
ALP ISO SERPL-ACNC: 111 U/L (ref 46–116)
ALT SERPLBLD-CCNC: 41 U/L (ref 12–78)
AST SERPL QL: 41 U/L (ref 15–37)
BILIRUB DIRECT SERPL-MCNC: 0.3 MG/DL (ref 0–0.2)
BILIRUB INDIRECT SERPL-MCNC: 1.3 MG/DL (ref 0–0.9)
BILIRUBIN,TOTAL: 1.6 MG/DL (ref 0.2–1)
CHOLEST SPEC-SCNC: 206 MG/DL (ref 140–200)
HDLC SERPL-MCNC: 73 MG/DL (ref 29–89)
PROT SERPL-MCNC: 6.8 GM/DL (ref 6.4–8.2)
TRIGLYCERIDES: 118 MG/DL (ref 30–200)

## 2019-09-25 PROCEDURE — 80061 LIPID PANEL: CPT

## 2019-09-25 PROCEDURE — 36415 COLL VENOUS BLD VENIPUNCTURE: CPT

## 2019-09-25 PROCEDURE — 80076 HEPATIC FUNCTION PANEL: CPT

## 2019-09-25 PROCEDURE — 74019 RADEX ABDOMEN 2 VIEWS: CPT

## 2019-09-25 PROCEDURE — 71046 X-RAY EXAM CHEST 2 VIEWS: CPT

## 2019-10-07 ENCOUNTER — HOSPITAL ENCOUNTER (OUTPATIENT)
Age: 79
End: 2019-10-07
Payer: MEDICARE

## 2019-10-07 DIAGNOSIS — E78.5: Primary | ICD-10-CM

## 2019-10-07 DIAGNOSIS — I10: ICD-10-CM

## 2019-10-07 DIAGNOSIS — I25.10: ICD-10-CM

## 2019-10-07 DIAGNOSIS — R07.89: ICD-10-CM

## 2019-10-07 DIAGNOSIS — R10.9: ICD-10-CM

## 2019-10-07 DIAGNOSIS — I48.91: ICD-10-CM

## 2019-10-07 PROCEDURE — A9502 TC99M TETROFOSMIN: HCPCS

## 2019-10-07 PROCEDURE — 78452 HT MUSCLE IMAGE SPECT MULT: CPT

## 2019-10-07 PROCEDURE — 93017 CV STRESS TEST TRACING ONLY: CPT

## 2019-12-18 ENCOUNTER — OFFICE (OUTPATIENT)
Dept: URBAN - METROPOLITAN AREA CLINIC 4 | Facility: CLINIC | Age: 79
End: 2019-12-18

## 2019-12-18 VITALS — WEIGHT: 180 LBS | DIASTOLIC BLOOD PRESSURE: 95 MMHG | SYSTOLIC BLOOD PRESSURE: 147 MMHG | HEIGHT: 67 IN

## 2019-12-18 DIAGNOSIS — R63.0 ANOREXIA: ICD-10-CM

## 2019-12-18 DIAGNOSIS — R10.13 EPIGASTRIC PAIN: ICD-10-CM

## 2019-12-18 DIAGNOSIS — K59.00 CONSTIPATION, UNSPECIFIED: ICD-10-CM

## 2019-12-18 DIAGNOSIS — R14.0 ABDOMINAL DISTENSION (GASEOUS): ICD-10-CM

## 2019-12-18 PROCEDURE — 99204 OFFICE O/P NEW MOD 45 MIN: CPT | Performed by: NURSE PRACTITIONER

## 2019-12-18 RX ORDER — OMEPRAZOLE 20 MG/1
20 CAPSULE, DELAYED RELEASE ORAL
Qty: 30 | Refills: 11 | Status: ACTIVE
Start: 2019-12-18

## 2020-01-06 ENCOUNTER — ON CAMPUS - OUTPATIENT (OUTPATIENT)
Dept: RURAL HOSPITAL 6 | Facility: HOSPITAL | Age: 80
End: 2020-01-06
Payer: COMMERCIAL

## 2020-01-06 DIAGNOSIS — R14.0 ABDOMINAL DISTENSION (GASEOUS): ICD-10-CM

## 2020-01-06 DIAGNOSIS — K30 FUNCTIONAL DYSPEPSIA: ICD-10-CM

## 2020-01-06 DIAGNOSIS — R10.13 EPIGASTRIC PAIN: ICD-10-CM

## 2020-01-06 DIAGNOSIS — K29.50 UNSPECIFIED CHRONIC GASTRITIS WITHOUT BLEEDING: ICD-10-CM

## 2020-01-06 PROCEDURE — 43239 EGD BIOPSY SINGLE/MULTIPLE: CPT | Performed by: INTERNAL MEDICINE

## 2020-04-27 ENCOUNTER — HOSPITAL ENCOUNTER (OUTPATIENT)
Age: 80
End: 2020-04-27
Payer: MEDICARE

## 2020-04-27 DIAGNOSIS — C73: Primary | ICD-10-CM

## 2020-04-27 DIAGNOSIS — R60.9: ICD-10-CM

## 2020-04-27 DIAGNOSIS — I25.118: ICD-10-CM

## 2020-04-27 DIAGNOSIS — E78.2: ICD-10-CM

## 2020-04-27 DIAGNOSIS — I48.91: ICD-10-CM

## 2020-04-27 DIAGNOSIS — R06.00: ICD-10-CM

## 2020-04-27 DIAGNOSIS — I27.20: ICD-10-CM

## 2020-04-27 DIAGNOSIS — I10: ICD-10-CM

## 2020-04-27 DIAGNOSIS — R94.31: ICD-10-CM

## 2020-04-27 LAB
ANION GAP SERPL CALC-SCNC: 9.8 MEQ/L (ref 5–15)
BUN SERPL-MCNC: 15 MG/DL (ref 7–17)
CALCIUM SPEC-MCNC: 9.7 MG/DL (ref 8.4–10.2)
CHLORIDE SPEC-SCNC: 104 MMOL/L (ref 98–107)
CO2 SERPL-SCNC: 28 MMOL/L (ref 22–30)
CREAT BLD-SCNC: 0.7 MG/DL (ref 0.52–1.04)
ESTIMATED GLOMERULAR FILT RATE: 81 ML/MIN (ref 60–?)
GFR (AFRICAN AMERICAN): 98 ML/MIN (ref 60–?)
GLUCOSE: 90 MG/DL (ref 74–100)
NT PRO BRAIN NATRIURETIC PEP.: 1020 PG/ML (ref 0–450)
POTASSIUM: 3.8 MMOL/L (ref 3.5–5.1)
SODIUM SPEC-SCNC: 138 MMOL/L (ref 136–145)

## 2020-04-27 PROCEDURE — 83880 ASSAY OF NATRIURETIC PEPTIDE: CPT

## 2020-04-27 PROCEDURE — 36415 COLL VENOUS BLD VENIPUNCTURE: CPT

## 2020-04-27 PROCEDURE — 80048 BASIC METABOLIC PNL TOTAL CA: CPT

## 2020-04-27 PROCEDURE — 93306 TTE W/DOPPLER COMPLETE: CPT

## 2020-05-04 ENCOUNTER — HOSPITAL ENCOUNTER (OUTPATIENT)
Age: 80
End: 2020-05-04
Payer: MEDICARE

## 2020-05-04 DIAGNOSIS — I50.30: Primary | ICD-10-CM

## 2020-05-04 LAB
ANION GAP SERPL CALC-SCNC: 12.2 MEQ/L (ref 5–15)
BUN SERPL-MCNC: 19 MG/DL (ref 7–17)
CALCIUM SPEC-MCNC: 9.9 MG/DL (ref 8.4–10.2)
CHLORIDE SPEC-SCNC: 97 MMOL/L (ref 98–107)
CO2 SERPL-SCNC: 31 MMOL/L (ref 22–30)
CREAT BLD-SCNC: 0.9 MG/DL (ref 0.52–1.04)
ESTIMATED GLOMERULAR FILT RATE: 61 ML/MIN (ref 60–?)
GFR (AFRICAN AMERICAN): 73 ML/MIN (ref 60–?)
GLUCOSE: 111 MG/DL (ref 74–100)
NT PRO BRAIN NATRIURETIC PEP.: 701 PG/ML (ref 0–450)
POTASSIUM: 4.2 MMOL/L (ref 3.5–5.1)
SODIUM SPEC-SCNC: 136 MMOL/L (ref 136–145)

## 2020-05-04 PROCEDURE — 83880 ASSAY OF NATRIURETIC PEPTIDE: CPT

## 2020-05-04 PROCEDURE — 36415 COLL VENOUS BLD VENIPUNCTURE: CPT

## 2020-05-04 PROCEDURE — 80048 BASIC METABOLIC PNL TOTAL CA: CPT

## 2020-05-27 ENCOUNTER — HOSPITAL ENCOUNTER (OUTPATIENT)
Age: 80
End: 2020-05-27
Payer: MEDICARE

## 2020-05-27 DIAGNOSIS — C73: Primary | ICD-10-CM

## 2020-05-27 DIAGNOSIS — R94.31: ICD-10-CM

## 2020-05-27 DIAGNOSIS — I27.20: ICD-10-CM

## 2020-05-27 DIAGNOSIS — I48.91: ICD-10-CM

## 2020-05-27 DIAGNOSIS — R06.00: ICD-10-CM

## 2020-05-27 DIAGNOSIS — I10: ICD-10-CM

## 2020-05-27 DIAGNOSIS — I25.118: ICD-10-CM

## 2020-05-27 DIAGNOSIS — R60.9: ICD-10-CM

## 2020-05-27 DIAGNOSIS — E78.2: ICD-10-CM

## 2020-05-27 LAB
ANION GAP SERPL CALC-SCNC: 10.1 MEQ/L (ref 5–15)
BUN SERPL-MCNC: 16 MG/DL (ref 7–17)
CALCIUM SPEC-MCNC: 9.7 MG/DL (ref 8.4–10.2)
CHLORIDE SPEC-SCNC: 101 MMOL/L (ref 98–107)
CO2 SERPL-SCNC: 31 MMOL/L (ref 22–30)
CREAT BLD-SCNC: 1 MG/DL (ref 0.52–1.04)
ESTIMATED GLOMERULAR FILT RATE: 54 ML/MIN (ref 60–?)
GFR (AFRICAN AMERICAN): 65 ML/MIN (ref 60–?)
GLUCOSE: 87 MG/DL (ref 74–100)
NT PRO BRAIN NATRIURETIC PEP.: 925 PG/ML (ref 0–450)
POTASSIUM: 4.1 MMOL/L (ref 3.5–5.1)
SODIUM SPEC-SCNC: 138 MMOL/L (ref 136–145)

## 2020-05-27 PROCEDURE — 80048 BASIC METABOLIC PNL TOTAL CA: CPT

## 2020-05-27 PROCEDURE — 83880 ASSAY OF NATRIURETIC PEPTIDE: CPT

## 2020-06-03 ENCOUNTER — HOSPITAL ENCOUNTER (OUTPATIENT)
Age: 80
End: 2020-06-03
Payer: MEDICARE

## 2020-06-03 DIAGNOSIS — I48.20: ICD-10-CM

## 2020-06-03 DIAGNOSIS — C73: Primary | ICD-10-CM

## 2020-06-03 DIAGNOSIS — R06.00: ICD-10-CM

## 2020-06-03 DIAGNOSIS — E78.2: ICD-10-CM

## 2020-06-03 DIAGNOSIS — I25.118: ICD-10-CM

## 2020-06-03 DIAGNOSIS — I11.9: ICD-10-CM

## 2020-06-03 DIAGNOSIS — R60.9: ICD-10-CM

## 2020-06-03 DIAGNOSIS — R94.31: ICD-10-CM

## 2020-06-03 LAB
ANION GAP SERPL CALC-SCNC: 13.1 MEQ/L (ref 5–15)
BUN SERPL-MCNC: 25 MG/DL (ref 7–17)
CALCIUM SPEC-MCNC: 9 MG/DL (ref 8.4–10.2)
CHLORIDE SPEC-SCNC: 100 MMOL/L (ref 98–107)
CO2 SERPL-SCNC: 29 MMOL/L (ref 22–30)
CREAT BLD-SCNC: 1.1 MG/DL (ref 0.52–1.04)
ESTIMATED GLOMERULAR FILT RATE: 48 ML/MIN (ref 60–?)
GFR (AFRICAN AMERICAN): 58 ML/MIN (ref 60–?)
GLUCOSE: 118 MG/DL (ref 74–100)
NT PRO BRAIN NATRIURETIC PEP.: 513 PG/ML (ref 0–450)
POTASSIUM: 4.1 MMOL/L (ref 3.5–5.1)
SODIUM SPEC-SCNC: 138 MMOL/L (ref 136–145)

## 2020-06-03 PROCEDURE — 36415 COLL VENOUS BLD VENIPUNCTURE: CPT

## 2020-06-03 PROCEDURE — 80048 BASIC METABOLIC PNL TOTAL CA: CPT

## 2020-06-03 PROCEDURE — 83880 ASSAY OF NATRIURETIC PEPTIDE: CPT

## 2020-10-12 ENCOUNTER — HOSPITAL ENCOUNTER (OUTPATIENT)
Age: 80
End: 2020-10-12
Payer: MEDICARE

## 2020-10-12 DIAGNOSIS — R42: Primary | ICD-10-CM

## 2020-10-12 PROCEDURE — 93880 EXTRACRANIAL BILAT STUDY: CPT

## 2021-01-06 ENCOUNTER — HOSPITAL ENCOUNTER (OUTPATIENT)
Age: 81
End: 2021-01-06
Payer: MEDICARE
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
## 2021-01-06 DIAGNOSIS — R42: Primary | ICD-10-CM

## 2021-01-06 PROCEDURE — 93270 REMOTE 30 DAY ECG REV/REPORT: CPT

## 2021-02-03 ENCOUNTER — HOSPITAL ENCOUNTER (OUTPATIENT)
Age: 81
End: 2021-02-03
Payer: MEDICARE

## 2021-02-03 DIAGNOSIS — I48.91: ICD-10-CM

## 2021-02-03 DIAGNOSIS — Z98.890: ICD-10-CM

## 2021-02-03 DIAGNOSIS — I49.5: ICD-10-CM

## 2021-02-03 DIAGNOSIS — R42: ICD-10-CM

## 2021-02-03 DIAGNOSIS — R60.0: ICD-10-CM

## 2021-02-03 DIAGNOSIS — E78.2: Primary | ICD-10-CM

## 2021-02-03 DIAGNOSIS — I11.9: ICD-10-CM

## 2021-02-03 DIAGNOSIS — R53.83: ICD-10-CM

## 2021-02-03 DIAGNOSIS — I25.10: ICD-10-CM

## 2021-02-03 LAB
ANION GAP SERPL CALC-SCNC: 9.7 MEQ/L (ref 5–15)
BUN SERPL-MCNC: 13 MG/DL (ref 7–17)
CALCIUM SPEC-MCNC: 10.2 MG/DL (ref 8.4–10.2)
CHLORIDE SPEC-SCNC: 103 MMOL/L (ref 98–107)
CO2 SERPL-SCNC: 30 MMOL/L (ref 22–30)
CREAT BLD-SCNC: 0.9 MG/DL (ref 0.52–1.04)
ESTIMATED GLOMERULAR FILT RATE: 60 ML/MIN (ref 60–?)
GFR (AFRICAN AMERICAN): 73 ML/MIN (ref 60–?)
GLUCOSE: 100 MG/DL (ref 74–100)
HCT VFR BLD CALC: 48.5 % (ref 37–47)
HGB BLD-MCNC: 15.9 G/DL (ref 12.2–16.2)
MCHC RBC-ENTMCNC: 32.8 G/DL (ref 31.8–35.4)
MCV RBC: 107.9 FL (ref 81–99)
MEAN CORPUSCULAR HEMOGLOBIN: 35.4 PG (ref 27–31.2)
PLATELET # BLD: 88 K/MM3 (ref 142–424)
POTASSIUM: 3.7 MMOL/L (ref 3.5–5.1)
RBC # BLD AUTO: 4.49 M/MM3 (ref 4.2–5.4)
SODIUM SPEC-SCNC: 139 MMOL/L (ref 136–145)
T4 FREE SERPL-MCNC: 1.79 NG/DL (ref 0.78–2.19)
TSH SERPL-ACNC: 2.15 UIU/ML (ref 0.47–4.68)
WBC # BLD AUTO: 4.8 K/MM3 (ref 4.8–10.8)

## 2021-02-03 PROCEDURE — 36415 COLL VENOUS BLD VENIPUNCTURE: CPT

## 2021-02-03 PROCEDURE — 80048 BASIC METABOLIC PNL TOTAL CA: CPT

## 2021-02-03 PROCEDURE — 84443 ASSAY THYROID STIM HORMONE: CPT

## 2021-02-03 PROCEDURE — 84439 ASSAY OF FREE THYROXINE: CPT

## 2021-02-03 PROCEDURE — 85025 COMPLETE CBC W/AUTO DIFF WBC: CPT

## 2021-02-08 ENCOUNTER — HOSPITAL ENCOUNTER (OUTPATIENT)
Age: 81
End: 2021-02-08
Payer: MEDICARE

## 2021-02-08 DIAGNOSIS — Z20.822: ICD-10-CM

## 2021-02-08 DIAGNOSIS — I25.10: ICD-10-CM

## 2021-02-08 DIAGNOSIS — Z01.812: Primary | ICD-10-CM

## 2021-02-08 LAB
ANION GAP SERPL CALC-SCNC: 10.7 MEQ/L (ref 5–15)
BUN SERPL-MCNC: 17 MG/DL (ref 7–17)
CALCIUM SPEC-MCNC: 9.5 MG/DL (ref 8.4–10.2)
CHLORIDE SPEC-SCNC: 103 MMOL/L (ref 98–107)
CO2 SERPL-SCNC: 30 MMOL/L (ref 22–30)
CREAT BLD-SCNC: 1 MG/DL (ref 0.52–1.04)
ESTIMATED GLOMERULAR FILT RATE: 53 ML/MIN (ref 60–?)
GFR (AFRICAN AMERICAN): 65 ML/MIN (ref 60–?)
GLUCOSE: 97 MG/DL (ref 74–100)
HCT VFR BLD CALC: 41.9 % (ref 37–47)
HGB BLD-MCNC: 14.2 G/DL (ref 12.2–16.2)
MCHC RBC-ENTMCNC: 33.9 G/DL (ref 31.8–35.4)
MCV RBC: 105.2 FL (ref 81–99)
MEAN CORPUSCULAR HEMOGLOBIN: 35.6 PG (ref 27–31.2)
PLATELET # BLD: 94 K/MM3 (ref 142–424)
POTASSIUM: 3.7 MMOL/L (ref 3.5–5.1)
RBC # BLD AUTO: 3.98 M/MM3 (ref 4.2–5.4)
SODIUM SPEC-SCNC: 140 MMOL/L (ref 136–145)
WBC # BLD AUTO: 5.4 K/MM3 (ref 4.8–10.8)

## 2021-02-08 PROCEDURE — 36415 COLL VENOUS BLD VENIPUNCTURE: CPT

## 2021-02-08 PROCEDURE — 86328 IA NFCT AB SARSCOV2 COVID19: CPT

## 2021-02-08 PROCEDURE — 85025 COMPLETE CBC W/AUTO DIFF WBC: CPT

## 2021-02-08 PROCEDURE — 80048 BASIC METABOLIC PNL TOTAL CA: CPT

## 2021-02-11 ENCOUNTER — HOSPITAL ENCOUNTER (OUTPATIENT)
Dept: HOSPITAL 22 - CATHLAB | Age: 81
End: 2021-02-11
Payer: MEDICARE

## 2021-02-11 VITALS
SYSTOLIC BLOOD PRESSURE: 117 MMHG | HEART RATE: 71 BPM | DIASTOLIC BLOOD PRESSURE: 74 MMHG | RESPIRATION RATE: 18 BRPM | OXYGEN SATURATION: 95 %

## 2021-02-11 VITALS
DIASTOLIC BLOOD PRESSURE: 64 MMHG | SYSTOLIC BLOOD PRESSURE: 122 MMHG | OXYGEN SATURATION: 92 % | HEART RATE: 65 BPM | RESPIRATION RATE: 17 BRPM

## 2021-02-11 VITALS — HEART RATE: 71 BPM

## 2021-02-11 VITALS
RESPIRATION RATE: 18 BRPM | OXYGEN SATURATION: 97 % | HEART RATE: 70 BPM | DIASTOLIC BLOOD PRESSURE: 71 MMHG | SYSTOLIC BLOOD PRESSURE: 132 MMHG

## 2021-02-11 VITALS
HEART RATE: 67 BPM | RESPIRATION RATE: 17 BRPM | OXYGEN SATURATION: 96 % | SYSTOLIC BLOOD PRESSURE: 100 MMHG | DIASTOLIC BLOOD PRESSURE: 43 MMHG

## 2021-02-11 VITALS
OXYGEN SATURATION: 97 % | HEART RATE: 73 BPM | SYSTOLIC BLOOD PRESSURE: 137 MMHG | RESPIRATION RATE: 20 BRPM | DIASTOLIC BLOOD PRESSURE: 82 MMHG

## 2021-02-11 VITALS — BODY MASS INDEX: 30.2 KG/M2

## 2021-02-11 VITALS
DIASTOLIC BLOOD PRESSURE: 71 MMHG | HEART RATE: 71 BPM | OXYGEN SATURATION: 94 % | TEMPERATURE: 98.4 F | SYSTOLIC BLOOD PRESSURE: 138 MMHG | RESPIRATION RATE: 16 BRPM

## 2021-02-11 VITALS
HEART RATE: 66 BPM | OXYGEN SATURATION: 95 % | RESPIRATION RATE: 18 BRPM | SYSTOLIC BLOOD PRESSURE: 126 MMHG | DIASTOLIC BLOOD PRESSURE: 54 MMHG

## 2021-02-11 VITALS — HEART RATE: 75 BPM

## 2021-02-11 DIAGNOSIS — I48.91: ICD-10-CM

## 2021-02-11 DIAGNOSIS — R60.0: ICD-10-CM

## 2021-02-11 DIAGNOSIS — I49.5: ICD-10-CM

## 2021-02-11 DIAGNOSIS — Z88.2: ICD-10-CM

## 2021-02-11 DIAGNOSIS — R42: ICD-10-CM

## 2021-02-11 DIAGNOSIS — Z79.899: ICD-10-CM

## 2021-02-11 DIAGNOSIS — R53.83: ICD-10-CM

## 2021-02-11 DIAGNOSIS — I25.10: ICD-10-CM

## 2021-02-11 DIAGNOSIS — Z88.0: ICD-10-CM

## 2021-02-11 DIAGNOSIS — Z79.82: ICD-10-CM

## 2021-02-11 DIAGNOSIS — E78.2: Primary | ICD-10-CM

## 2021-02-11 DIAGNOSIS — I11.9: ICD-10-CM

## 2021-02-11 PROCEDURE — C1786 PMKR, SINGLE, RATE-RESP: HCPCS

## 2021-02-11 PROCEDURE — C1898 LEAD, PMKR, OTHER THAN TRANS: HCPCS

## 2021-02-11 PROCEDURE — 33207 INSERT HEART PM VENTRICULAR: CPT

## 2021-02-11 PROCEDURE — 71045 X-RAY EXAM CHEST 1 VIEW: CPT

## 2021-08-04 ENCOUNTER — HOSPITAL ENCOUNTER (OUTPATIENT)
Age: 81
End: 2021-08-04
Payer: MEDICARE

## 2021-08-04 DIAGNOSIS — I10: ICD-10-CM

## 2021-08-04 DIAGNOSIS — I48.91: ICD-10-CM

## 2021-08-04 DIAGNOSIS — I25.10: ICD-10-CM

## 2021-08-04 DIAGNOSIS — R60.0: ICD-10-CM

## 2021-08-04 DIAGNOSIS — I11.9: ICD-10-CM

## 2021-08-04 DIAGNOSIS — Z95.0: ICD-10-CM

## 2021-08-04 DIAGNOSIS — E78.5: Primary | ICD-10-CM

## 2021-08-04 DIAGNOSIS — Z98.890: ICD-10-CM

## 2021-08-04 LAB
ANION GAP SERPL CALC-SCNC: 14.2 MEQ/L (ref 5–15)
BUN SERPL-MCNC: 17 MG/DL (ref 7–17)
CALCIUM SPEC-MCNC: 9.8 MG/DL (ref 8.4–10.2)
CHLORIDE SPEC-SCNC: 101 MMOL/L (ref 98–107)
CO2 SERPL-SCNC: 31 MMOL/L (ref 22–30)
CREAT BLD-SCNC: 1 MG/DL (ref 0.52–1.04)
ESTIMATED GLOMERULAR FILT RATE: 53 ML/MIN (ref 60–?)
GFR (AFRICAN AMERICAN): 65 ML/MIN (ref 60–?)
GLUCOSE: 107 MG/DL (ref 74–100)
POTASSIUM: 3.2 MMOL/L (ref 3.5–5.1)
SODIUM SPEC-SCNC: 143 MMOL/L (ref 136–145)

## 2021-08-04 PROCEDURE — 36415 COLL VENOUS BLD VENIPUNCTURE: CPT

## 2021-08-04 PROCEDURE — 80048 BASIC METABOLIC PNL TOTAL CA: CPT

## 2021-11-03 ENCOUNTER — HOSPITAL ENCOUNTER (OUTPATIENT)
Age: 81
End: 2021-11-03
Payer: MEDICARE

## 2021-11-03 DIAGNOSIS — R42: ICD-10-CM

## 2021-11-03 DIAGNOSIS — I77.9: Primary | ICD-10-CM

## 2021-11-03 PROCEDURE — 93880 EXTRACRANIAL BILAT STUDY: CPT

## 2022-03-19 ENCOUNTER — HOSPITAL ENCOUNTER (OUTPATIENT)
Age: 82
End: 2022-03-19
Payer: MEDICARE

## 2022-03-19 DIAGNOSIS — Z11.52: ICD-10-CM

## 2022-03-19 DIAGNOSIS — Z01.812: Primary | ICD-10-CM

## 2022-03-19 PROCEDURE — U0005 INFEC AGEN DETEC AMPLI PROBE: HCPCS

## 2022-03-19 PROCEDURE — U0003 INFECTIOUS AGENT DETECTION BY NUCLEIC ACID (DNA OR RNA); SEVERE ACUTE RESPIRATORY SYNDROME CORONAVIRUS 2 (SARS-COV-2) (CORONAVIRUS DISEASE [COVID-19]), AMPLIFIED PROBE TECHNIQUE, MAKING USE OF HIGH THROUGHPUT TECHNOLOGIES AS DESCRIBED BY CMS-2020-01-R: HCPCS

## 2022-03-19 PROCEDURE — C9803 HOPD COVID-19 SPEC COLLECT: HCPCS

## 2022-03-22 ENCOUNTER — HOSPITAL ENCOUNTER (OUTPATIENT)
Dept: HOSPITAL 22 - OR | Age: 82
Discharge: HOME | End: 2022-03-22
Payer: MEDICARE

## 2022-03-22 VITALS
HEART RATE: 61 BPM | SYSTOLIC BLOOD PRESSURE: 115 MMHG | OXYGEN SATURATION: 99 % | DIASTOLIC BLOOD PRESSURE: 77 MMHG | RESPIRATION RATE: 16 BRPM

## 2022-03-22 VITALS
RESPIRATION RATE: 16 BRPM | OXYGEN SATURATION: 99 % | SYSTOLIC BLOOD PRESSURE: 119 MMHG | HEART RATE: 65 BPM | DIASTOLIC BLOOD PRESSURE: 77 MMHG

## 2022-03-22 VITALS
OXYGEN SATURATION: 97 % | RESPIRATION RATE: 18 BRPM | DIASTOLIC BLOOD PRESSURE: 66 MMHG | TEMPERATURE: 98.78 F | SYSTOLIC BLOOD PRESSURE: 142 MMHG | HEART RATE: 69 BPM

## 2022-03-22 VITALS
HEART RATE: 66 BPM | RESPIRATION RATE: 16 BRPM | OXYGEN SATURATION: 100 % | DIASTOLIC BLOOD PRESSURE: 81 MMHG | SYSTOLIC BLOOD PRESSURE: 104 MMHG

## 2022-03-22 VITALS
HEART RATE: 67 BPM | DIASTOLIC BLOOD PRESSURE: 79 MMHG | TEMPERATURE: 97.8 F | SYSTOLIC BLOOD PRESSURE: 135 MMHG | RESPIRATION RATE: 18 BRPM | OXYGEN SATURATION: 97 %

## 2022-03-22 VITALS
DIASTOLIC BLOOD PRESSURE: 66 MMHG | RESPIRATION RATE: 18 BRPM | SYSTOLIC BLOOD PRESSURE: 142 MMHG | OXYGEN SATURATION: 97 % | TEMPERATURE: 98.7 F | HEART RATE: 69 BPM

## 2022-03-22 VITALS
SYSTOLIC BLOOD PRESSURE: 113 MMHG | DIASTOLIC BLOOD PRESSURE: 78 MMHG | RESPIRATION RATE: 16 BRPM | HEART RATE: 68 BPM | OXYGEN SATURATION: 98 %

## 2022-03-22 VITALS — BODY MASS INDEX: 27.4 KG/M2

## 2022-03-22 DIAGNOSIS — I10: ICD-10-CM

## 2022-03-22 DIAGNOSIS — K75.9: ICD-10-CM

## 2022-03-22 DIAGNOSIS — Z96.1: ICD-10-CM

## 2022-03-22 DIAGNOSIS — Z85.850: ICD-10-CM

## 2022-03-22 DIAGNOSIS — K21.9: ICD-10-CM

## 2022-03-22 DIAGNOSIS — M19.90: ICD-10-CM

## 2022-03-22 DIAGNOSIS — I48.91: ICD-10-CM

## 2022-03-22 DIAGNOSIS — H25.811: Primary | ICD-10-CM

## 2022-03-22 DIAGNOSIS — Z88.2: ICD-10-CM

## 2022-03-22 DIAGNOSIS — Z88.1: ICD-10-CM

## 2022-03-22 PROCEDURE — 66984 XCAPSL CTRC RMVL W/O ECP: CPT

## 2022-03-22 PROCEDURE — V2788 PRESBYOPIA-CORRECT FUNCTION: HCPCS

## 2022-03-22 PROCEDURE — V2632 POST CHMBR INTRAOCULAR LENS: HCPCS

## 2022-05-11 ENCOUNTER — HOSPITAL ENCOUNTER (OUTPATIENT)
Age: 82
End: 2022-05-11
Payer: MEDICARE

## 2022-05-11 DIAGNOSIS — R06.00: ICD-10-CM

## 2022-05-11 DIAGNOSIS — I34.0: Primary | ICD-10-CM

## 2022-05-11 PROCEDURE — 93306 TTE W/DOPPLER COMPLETE: CPT

## 2022-05-26 ENCOUNTER — HOSPITAL ENCOUNTER (EMERGENCY)
Age: 82
Discharge: HOME | End: 2022-05-26
Payer: MEDICARE

## 2022-05-26 VITALS
HEART RATE: 70 BPM | SYSTOLIC BLOOD PRESSURE: 148 MMHG | DIASTOLIC BLOOD PRESSURE: 84 MMHG | TEMPERATURE: 97.6 F | OXYGEN SATURATION: 98 % | RESPIRATION RATE: 18 BRPM

## 2022-05-26 VITALS
TEMPERATURE: 97.6 F | DIASTOLIC BLOOD PRESSURE: 79 MMHG | SYSTOLIC BLOOD PRESSURE: 128 MMHG | RESPIRATION RATE: 18 BRPM | OXYGEN SATURATION: 98 % | HEART RATE: 68 BPM

## 2022-05-26 VITALS — BODY MASS INDEX: 32.3 KG/M2

## 2022-05-26 DIAGNOSIS — I10: ICD-10-CM

## 2022-05-26 DIAGNOSIS — D72.819: ICD-10-CM

## 2022-05-26 DIAGNOSIS — Z79.899: ICD-10-CM

## 2022-05-26 DIAGNOSIS — E03.9: ICD-10-CM

## 2022-05-26 DIAGNOSIS — Z88.2: ICD-10-CM

## 2022-05-26 DIAGNOSIS — Z88.1: ICD-10-CM

## 2022-05-26 DIAGNOSIS — I48.20: ICD-10-CM

## 2022-05-26 DIAGNOSIS — S00.03XA: Primary | ICD-10-CM

## 2022-05-26 DIAGNOSIS — M19.90: ICD-10-CM
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
## 2022-05-26 DIAGNOSIS — R94.31: ICD-10-CM

## 2022-05-26 DIAGNOSIS — I25.10: ICD-10-CM

## 2022-05-26 DIAGNOSIS — I87.8: ICD-10-CM

## 2022-05-26 DIAGNOSIS — Z95.0: ICD-10-CM

## 2022-05-26 DIAGNOSIS — F41.9: ICD-10-CM

## 2022-05-26 DIAGNOSIS — R42: ICD-10-CM

## 2022-05-26 DIAGNOSIS — E78.5: ICD-10-CM

## 2022-05-26 DIAGNOSIS — Z88.3: ICD-10-CM

## 2022-05-26 LAB
ALBUMIN LEVEL: 3.9 G/DL (ref 3.5–5)
ALBUMIN/GLOB SERPL: 1.5 {RATIO} (ref 1.1–1.8)
ALP ISO SERPL-ACNC: 148 U/L (ref 38–126)
ALT SERPLBLD-CCNC: 59 U/L (ref 12–78)
ANION GAP SERPL CALC-SCNC: 7.8 MEQ/L (ref 5–15)
AST SERPL QL: 84 U/L (ref 14–36)
BILIRUBIN,TOTAL: 2 MG/DL (ref 0.2–1.3)
BUN SERPL-MCNC: 16 MG/DL (ref 7–17)
CALCIUM SPEC-MCNC: 9 MG/DL (ref 8.4–10.2)
CHLORIDE SPEC-SCNC: 105 MMOL/L (ref 98–107)
CO2 SERPL-SCNC: 32 MMOL/L (ref 22–30)
CREAT BLD-SCNC: 1 MG/DL (ref 0.52–1.04)
CREATININE CLEARANCE ESTIMATED: 62 ML/MIN (ref 50–200)
ESTIMATED GLOMERULAR FILT RATE: 53 ML/MIN (ref 60–?)
GFR (AFRICAN AMERICAN): 65 ML/MIN (ref 60–?)
GLOBULIN SER CALC-MCNC: 2.6 G/DL (ref 1.3–3.2)
GLUCOSE: 124 MG/DL (ref 74–100)
HCT VFR BLD CALC: 40.3 % (ref 37–47)
HGB BLD-MCNC: 13 G/DL (ref 12.2–16.2)
INR PPP: 1.19 (ref 0.9–1.1)
MCHC RBC-ENTMCNC: 32.3 G/DL (ref 31.8–35.4)
MCV RBC: 112 FL (ref 81–99)
MEAN CORPUSCULAR HEMOGLOBIN: 36.2 PG (ref 27–31.2)
PLATELET # BLD: 80 K/MM3 (ref 142–424)
POTASSIUM: 3.8 MMOL/L (ref 3.5–5.1)
PROT SERPL-MCNC: 6.5 G/DL (ref 6.3–8.2)
PT BLD: 13.3 SECONDS (ref 10.1–12.5)
RBC # BLD AUTO: 3.6 M/MM3 (ref 4.2–5.4)
SODIUM SPEC-SCNC: 141 MMOL/L (ref 136–145)
WBC # BLD AUTO: 4.1 K/MM3 (ref 4.8–10.8)

## 2022-05-26 PROCEDURE — 80053 COMPREHEN METABOLIC PANEL: CPT

## 2022-05-26 PROCEDURE — 72170 X-RAY EXAM OF PELVIS: CPT

## 2022-05-26 PROCEDURE — 70450 CT HEAD/BRAIN W/O DYE: CPT

## 2022-05-26 PROCEDURE — 85610 PROTHROMBIN TIME: CPT

## 2022-05-26 PROCEDURE — 99285 EMERGENCY DEPT VISIT HI MDM: CPT

## 2022-05-26 PROCEDURE — 72125 CT NECK SPINE W/O DYE: CPT

## 2022-05-26 PROCEDURE — 71045 X-RAY EXAM CHEST 1 VIEW: CPT

## 2022-05-26 PROCEDURE — 85025 COMPLETE CBC W/AUTO DIFF WBC: CPT

## 2022-05-30 ENCOUNTER — HOSPITAL ENCOUNTER (EMERGENCY)
Age: 82
Discharge: HOME | End: 2022-05-30
Payer: MEDICARE

## 2022-05-30 VITALS
SYSTOLIC BLOOD PRESSURE: 124 MMHG | HEART RATE: 73 BPM | RESPIRATION RATE: 19 BRPM | TEMPERATURE: 98.06 F | DIASTOLIC BLOOD PRESSURE: 62 MMHG

## 2022-05-30 VITALS
SYSTOLIC BLOOD PRESSURE: 124 MMHG | HEART RATE: 73 BPM | RESPIRATION RATE: 19 BRPM | DIASTOLIC BLOOD PRESSURE: 62 MMHG | OXYGEN SATURATION: 98 % | TEMPERATURE: 98.06 F

## 2022-05-30 VITALS — BODY MASS INDEX: 31.6 KG/M2

## 2022-05-30 DIAGNOSIS — E03.9: ICD-10-CM

## 2022-05-30 DIAGNOSIS — I10: ICD-10-CM

## 2022-05-30 DIAGNOSIS — Z79.899: ICD-10-CM

## 2022-05-30 DIAGNOSIS — Z88.2: ICD-10-CM

## 2022-05-30 DIAGNOSIS — I48.91: ICD-10-CM

## 2022-05-30 DIAGNOSIS — Z79.82: ICD-10-CM

## 2022-05-30 DIAGNOSIS — M54.50: ICD-10-CM

## 2022-05-30 DIAGNOSIS — Z88.1: ICD-10-CM

## 2022-05-30 DIAGNOSIS — Z88.0: ICD-10-CM

## 2022-05-30 DIAGNOSIS — Z95.0: ICD-10-CM

## 2022-05-30 DIAGNOSIS — M19.90: ICD-10-CM

## 2022-05-30 DIAGNOSIS — I25.10: ICD-10-CM

## 2022-05-30 DIAGNOSIS — F41.9: ICD-10-CM

## 2022-05-30 DIAGNOSIS — Z88.3: ICD-10-CM

## 2022-05-30 DIAGNOSIS — N39.0: Primary | ICD-10-CM

## 2022-05-30 DIAGNOSIS — E78.5: ICD-10-CM

## 2022-05-30 LAB
PH,URINE: 6 (ref 5–8.5)
SPECIFIC GRAVITY, URINE: 1.02 (ref 1–1.03)
UROBILINOGEN,URINE: 2 EU/DL

## 2022-05-30 PROCEDURE — G0463 HOSPITAL OUTPT CLINIC VISIT: HCPCS

## 2022-05-30 PROCEDURE — 87186 SC STD MICRODIL/AGAR DIL: CPT

## 2022-05-30 PROCEDURE — 87088 URINE BACTERIA CULTURE: CPT

## 2022-05-30 PROCEDURE — 81003 URINALYSIS AUTO W/O SCOPE: CPT

## 2022-05-30 PROCEDURE — 99213 OFFICE O/P EST LOW 20 MIN: CPT

## 2022-05-30 PROCEDURE — 87086 URINE CULTURE/COLONY COUNT: CPT

## 2022-06-22 ENCOUNTER — HOSPITAL ENCOUNTER (OUTPATIENT)
Age: 82
End: 2022-06-22
Payer: MEDICARE

## 2022-06-22 DIAGNOSIS — R53.1: Primary | ICD-10-CM

## 2022-06-22 DIAGNOSIS — R53.83: ICD-10-CM

## 2022-06-22 DIAGNOSIS — D69.6: ICD-10-CM

## 2022-06-22 DIAGNOSIS — N39.0: ICD-10-CM

## 2022-06-22 DIAGNOSIS — B95.8: ICD-10-CM

## 2022-06-22 DIAGNOSIS — E66.3: ICD-10-CM

## 2022-06-22 DIAGNOSIS — E78.5: ICD-10-CM

## 2022-06-22 LAB
25-OH VITAMIN D, TOTAL: 78.1 NG/ML (ref 30–100)
ALBUMIN LEVEL: 3.8 G/DL (ref 3.5–5)
ALBUMIN/GLOB SERPL: 1.5 {RATIO} (ref 1.1–1.8)
ALP ISO SERPL-ACNC: 154 U/L (ref 38–126)
ALT SERPLBLD-CCNC: 46 U/L (ref 12–78)
ANION GAP SERPL CALC-SCNC: 10.1 MEQ/L (ref 5–15)
AST SERPL QL: 55 U/L (ref 14–36)
BILIRUBIN,TOTAL: 2.3 MG/DL (ref 0.2–1.3)
BUN SERPL-MCNC: 16 MG/DL (ref 7–17)
CALCIUM SPEC-MCNC: 9.3 MG/DL (ref 8.4–10.2)
CHLORIDE SPEC-SCNC: 105 MMOL/L (ref 98–107)
CHOLEST SPEC-SCNC: 202 MG/DL (ref 140–200)
CO2 SERPL-SCNC: 28 MMOL/L (ref 22–30)
CREAT BLD-SCNC: 0.8 MG/DL (ref 0.52–1.04)
ESTIMATED GLOMERULAR FILT RATE: 69 ML/MIN (ref 60–?)
GFR (AFRICAN AMERICAN): 84 ML/MIN (ref 60–?)
GLOBULIN SER CALC-MCNC: 2.5 G/DL (ref 1.3–3.2)
GLUCOSE: 104 MG/DL (ref 74–100)
HCT VFR BLD CALC: 41.2 % (ref 37–47)
HDLC SERPL-MCNC: 89 MG/DL (ref 40–60)
HGB BLD-MCNC: 13.3 G/DL (ref 12.2–16.2)
MCHC RBC-ENTMCNC: 32.4 G/DL (ref 31.8–35.4)
MCV RBC: 111.5 FL (ref 81–99)
MEAN CORPUSCULAR HEMOGLOBIN: 36.1 PG (ref 27–31.2)
PLATELET # BLD: 80 K/MM3 (ref 142–424)
POTASSIUM: 4.1 MMOL/L (ref 3.5–5.1)
PROT SERPL-MCNC: 6.3 G/DL (ref 6.3–8.2)
RBC # BLD AUTO: 3.69 M/MM3 (ref 4.2–5.4)
SODIUM SPEC-SCNC: 139 MMOL/L (ref 136–145)
TRIGLYCERIDES: 71 MG/DL (ref 30–150)
TSH SERPL-ACNC: 6.04 UIU/ML (ref 0.47–4.68)
WBC # BLD AUTO: 3.5 K/MM3 (ref 4.8–10.8)

## 2022-06-22 PROCEDURE — 80053 COMPREHEN METABOLIC PANEL: CPT

## 2022-06-22 PROCEDURE — 87088 URINE BACTERIA CULTURE: CPT

## 2022-06-22 PROCEDURE — 87186 SC STD MICRODIL/AGAR DIL: CPT

## 2022-06-22 PROCEDURE — 87086 URINE CULTURE/COLONY COUNT: CPT

## 2022-06-22 PROCEDURE — 84443 ASSAY THYROID STIM HORMONE: CPT

## 2022-06-22 PROCEDURE — 80061 LIPID PANEL: CPT

## 2022-06-22 PROCEDURE — 82306 VITAMIN D 25 HYDROXY: CPT

## 2022-06-22 PROCEDURE — 85025 COMPLETE CBC W/AUTO DIFF WBC: CPT

## 2022-07-06 ENCOUNTER — HOSPITAL ENCOUNTER (OUTPATIENT)
Age: 82
End: 2022-07-06
Payer: MEDICARE

## 2022-07-06 DIAGNOSIS — B96.20: ICD-10-CM

## 2022-07-06 DIAGNOSIS — N39.0: Primary | ICD-10-CM

## 2022-07-06 LAB
BACTERIA URNS QL MICRO: (no result) /LPF
COLOR UR: YELLOW
LEUKOCYTE ESTERASE UR QL STRIP: (no result)
MICRO URNS: (no result)
PH UR: 6.5 [PH] (ref 5–8.5)
SP GR UR: 1.02 (ref 1–1.03)
SQUAMOUS URNS QL MICRO: (no result) #/HPF (ref 0–5)
UROBILINOGEN UR QL: 1 EU/DL

## 2022-07-06 PROCEDURE — 87088 URINE BACTERIA CULTURE: CPT

## 2022-07-06 PROCEDURE — 87086 URINE CULTURE/COLONY COUNT: CPT

## 2022-07-06 PROCEDURE — 87186 SC STD MICRODIL/AGAR DIL: CPT

## 2022-07-06 PROCEDURE — 81001 URINALYSIS AUTO W/SCOPE: CPT

## 2022-07-14 ENCOUNTER — HOSPITAL ENCOUNTER (OUTPATIENT)
Age: 82
End: 2022-07-14
Payer: MEDICARE

## 2022-07-14 DIAGNOSIS — D61.818: Primary | ICD-10-CM

## 2022-07-14 LAB
ALBUMIN LEVEL: 3.6 G/DL (ref 3.5–5)
ALBUMIN/GLOB SERPL: 1.4 {RATIO} (ref 1.1–1.8)
ALP ISO SERPL-ACNC: 124 U/L (ref 38–126)
ALT SERPLBLD-CCNC: 49 U/L (ref 12–78)
ANION GAP SERPL CALC-SCNC: 10.9 MEQ/L (ref 5–15)
AST SERPL QL: 58 U/L (ref 14–36)
BILIRUBIN,TOTAL: 1.8 MG/DL (ref 0.2–1.3)
BUN SERPL-MCNC: 20 MG/DL (ref 7–17)
CALCIUM SPEC-MCNC: 8.9 MG/DL (ref 8.4–10.2)
CHLORIDE SPEC-SCNC: 107 MMOL/L (ref 98–107)
CO2 SERPL-SCNC: 25 MMOL/L (ref 22–30)
CREAT BLD-SCNC: 0.8 MG/DL (ref 0.52–1.04)
ESTIMATED GLOMERULAR FILT RATE: 69 ML/MIN (ref 60–?)
FOLATE: 6.32 NG/ML
GFR (AFRICAN AMERICAN): 84 ML/MIN (ref 60–?)
GLOBULIN SER CALC-MCNC: 2.5 G/DL (ref 1.3–3.2)
GLUCOSE: 124 MG/DL (ref 74–100)
HCT VFR BLD CALC: 42.7 % (ref 37–47)
HGB BLD-MCNC: 13.4 G/DL (ref 12.2–16.2)
MCHC RBC-ENTMCNC: 31.4 G/DL (ref 31.8–35.4)
MCV RBC: 113.6 FL (ref 81–99)
MEAN CORPUSCULAR HEMOGLOBIN: 35.6 PG (ref 27–31.2)
PLATELET # BLD: 71 K/MM3 (ref 142–424)
POTASSIUM: 3.9 MMOL/L (ref 3.5–5.1)
PROT SERPL-MCNC: 6.1 G/DL (ref 6.3–8.2)
RBC # BLD AUTO: 3.76 M/MM3 (ref 4.2–5.4)
SODIUM SPEC-SCNC: 139 MMOL/L (ref 136–145)
VITAMIN B12: 633 PG/ML (ref 239–931)
WBC # BLD AUTO: 4.3 K/MM3 (ref 4.8–10.8)

## 2022-07-14 PROCEDURE — 82746 ASSAY OF FOLIC ACID SERUM: CPT

## 2022-07-14 PROCEDURE — 82607 VITAMIN B-12: CPT

## 2022-07-14 PROCEDURE — 36415 COLL VENOUS BLD VENIPUNCTURE: CPT

## 2022-07-14 PROCEDURE — 80053 COMPREHEN METABOLIC PANEL: CPT

## 2022-07-14 PROCEDURE — 85025 COMPLETE CBC W/AUTO DIFF WBC: CPT

## 2022-07-19 ENCOUNTER — HOSPITAL ENCOUNTER (OUTPATIENT)
Age: 82
End: 2022-07-19
Payer: MEDICARE

## 2022-07-19 DIAGNOSIS — D61.818: Primary | ICD-10-CM

## 2022-07-19 PROCEDURE — 76700 US EXAM ABDOM COMPLETE: CPT

## 2022-07-25 ENCOUNTER — HOSPITAL ENCOUNTER (OUTPATIENT)
Age: 82
End: 2022-07-25
Payer: MEDICARE

## 2022-07-25 DIAGNOSIS — F99: Primary | ICD-10-CM

## 2022-07-25 DIAGNOSIS — N39.0: ICD-10-CM

## 2022-07-25 DIAGNOSIS — B96.1: ICD-10-CM

## 2022-07-25 PROCEDURE — 87086 URINE CULTURE/COLONY COUNT: CPT

## 2022-07-25 PROCEDURE — 87186 SC STD MICRODIL/AGAR DIL: CPT

## 2022-07-25 PROCEDURE — 87088 URINE BACTERIA CULTURE: CPT

## 2022-08-23 ENCOUNTER — HOSPITAL ENCOUNTER (OUTPATIENT)
Age: 82
End: 2022-08-23
Payer: MEDICARE

## 2022-08-23 DIAGNOSIS — B96.4: ICD-10-CM

## 2022-08-23 DIAGNOSIS — N39.0: Primary | ICD-10-CM

## 2022-08-23 PROCEDURE — 87186 SC STD MICRODIL/AGAR DIL: CPT

## 2022-08-23 PROCEDURE — 87088 URINE BACTERIA CULTURE: CPT

## 2022-08-23 PROCEDURE — 87086 URINE CULTURE/COLONY COUNT: CPT

## 2022-08-26 ENCOUNTER — HOSPITAL ENCOUNTER (OUTPATIENT)
Dept: HOSPITAL 22 - PT | Age: 82
Discharge: HOME | End: 2022-08-26
Payer: MEDICARE

## 2022-08-26 DIAGNOSIS — I87.2: Primary | ICD-10-CM

## 2022-08-26 PROCEDURE — 97163 PT EVAL HIGH COMPLEX 45 MIN: CPT

## 2022-08-26 PROCEDURE — 97760 ORTHOTIC MGMT&TRAING 1ST ENC: CPT

## 2022-08-26 PROCEDURE — 97110 THERAPEUTIC EXERCISES: CPT

## 2022-08-26 PROCEDURE — 97164 PT RE-EVAL EST PLAN CARE: CPT

## 2022-08-26 PROCEDURE — 97112 NEUROMUSCULAR REEDUCATION: CPT

## 2022-08-26 PROCEDURE — 97140 MANUAL THERAPY 1/> REGIONS: CPT

## 2022-09-08 ENCOUNTER — HOSPITAL ENCOUNTER (OUTPATIENT)
Age: 82
End: 2022-09-08
Payer: MEDICARE

## 2022-09-08 DIAGNOSIS — R22.9: Primary | ICD-10-CM

## 2022-09-08 DIAGNOSIS — Z85.850: ICD-10-CM

## 2022-09-08 PROCEDURE — 70490 CT SOFT TISSUE NECK W/O DYE: CPT

## 2022-09-16 ENCOUNTER — HOSPITAL ENCOUNTER (OUTPATIENT)
Age: 82
End: 2022-09-16
Payer: MEDICARE

## 2022-09-16 DIAGNOSIS — A04.72: ICD-10-CM

## 2022-09-16 DIAGNOSIS — A04.0: ICD-10-CM

## 2022-09-16 DIAGNOSIS — R19.7: Primary | ICD-10-CM

## 2022-09-16 PROCEDURE — 87506 IADNA-DNA/RNA PROBE TQ 6-11: CPT

## 2022-10-05 ENCOUNTER — HOSPITAL ENCOUNTER (OUTPATIENT)
Age: 82
End: 2022-10-05
Payer: MEDICARE

## 2022-10-05 DIAGNOSIS — R53.83: Primary | ICD-10-CM

## 2022-10-05 DIAGNOSIS — I10: ICD-10-CM

## 2022-10-05 DIAGNOSIS — B96.1: ICD-10-CM

## 2022-10-05 LAB
ALBUMIN LEVEL: 3.5 G/DL (ref 3.5–5)
ALBUMIN/GLOB SERPL: 1.4 {RATIO} (ref 1.1–1.8)
ALP ISO SERPL-ACNC: 146 U/L (ref 38–126)
ALT SERPLBLD-CCNC: 44 U/L (ref 12–78)
ANION GAP SERPL CALC-SCNC: 11.3 MEQ/L (ref 5–15)
AST SERPL QL: 69 U/L (ref 14–36)
BILIRUBIN,TOTAL: 2.2 MG/DL (ref 0.2–1.3)
BUN SERPL-MCNC: 21 MG/DL (ref 7–17)
CALCIUM SPEC-MCNC: 8.8 MG/DL (ref 8.4–10.2)
CHLORIDE SPEC-SCNC: 108 MMOL/L (ref 98–107)
CO2 SERPL-SCNC: 24 MMOL/L (ref 22–30)
CREAT BLD-SCNC: 0.8 MG/DL (ref 0.52–1.04)
ESTIMATED GLOMERULAR FILT RATE: 69 ML/MIN (ref 60–?)
GFR (AFRICAN AMERICAN): 83 ML/MIN (ref 60–?)
GLOBULIN SER CALC-MCNC: 2.5 G/DL (ref 1.3–3.2)
GLUCOSE: 105 MG/DL (ref 74–100)
HCT VFR BLD CALC: 37.9 % (ref 37–47)
HGB BLD-MCNC: 12.2 G/DL (ref 12.2–16.2)
MCHC RBC-ENTMCNC: 32.3 G/DL (ref 31.8–35.4)
MCV RBC: 111.1 FL (ref 81–99)
MEAN CORPUSCULAR HEMOGLOBIN: 35.9 PG (ref 27–31.2)
PLATELET # BLD: 87 K/MM3 (ref 142–424)
POTASSIUM: 4.3 MMOL/L (ref 3.5–5.1)
PROT SERPL-MCNC: 6 G/DL (ref 6.3–8.2)
RBC # BLD AUTO: 3.41 M/MM3 (ref 4.2–5.4)
SODIUM SPEC-SCNC: 139 MMOL/L (ref 136–145)
WBC # BLD AUTO: 4.4 K/MM3 (ref 4.8–10.8)

## 2022-10-05 PROCEDURE — 87086 URINE CULTURE/COLONY COUNT: CPT

## 2022-10-05 PROCEDURE — 85025 COMPLETE CBC W/AUTO DIFF WBC: CPT

## 2022-10-05 PROCEDURE — 87088 URINE BACTERIA CULTURE: CPT

## 2022-10-05 PROCEDURE — 87186 SC STD MICRODIL/AGAR DIL: CPT

## 2022-10-05 PROCEDURE — 80053 COMPREHEN METABOLIC PANEL: CPT

## 2022-10-24 ENCOUNTER — HOSPITAL ENCOUNTER (INPATIENT)
Dept: HOSPITAL 22 - ER | Age: 82
LOS: 2 days | Discharge: NURSING, REHAB OR LONG-TERM CARE | DRG: 481 | End: 2022-10-26
Payer: MEDICARE

## 2022-10-24 VITALS
TEMPERATURE: 98.7 F | OXYGEN SATURATION: 95 % | RESPIRATION RATE: 17 BRPM | DIASTOLIC BLOOD PRESSURE: 56 MMHG | SYSTOLIC BLOOD PRESSURE: 119 MMHG | HEART RATE: 90 BPM

## 2022-10-24 VITALS
RESPIRATION RATE: 12 BRPM | HEART RATE: 88 BPM | DIASTOLIC BLOOD PRESSURE: 54 MMHG | TEMPERATURE: 97.88 F | SYSTOLIC BLOOD PRESSURE: 103 MMHG | OXYGEN SATURATION: 96 %

## 2022-10-24 VITALS
OXYGEN SATURATION: 97 % | SYSTOLIC BLOOD PRESSURE: 136 MMHG | BODY MASS INDEX: 30.7 KG/M2 | BODY MASS INDEX: 32 KG/M2 | RESPIRATION RATE: 20 BRPM | BODY MASS INDEX: 32.6 KG/M2 | TEMPERATURE: 97.88 F | HEART RATE: 60 BPM | BODY MASS INDEX: 32.2 KG/M2 | DIASTOLIC BLOOD PRESSURE: 64 MMHG

## 2022-10-24 VITALS
TEMPERATURE: 97 F | RESPIRATION RATE: 12 BRPM | OXYGEN SATURATION: 94 % | HEART RATE: 77 BPM | SYSTOLIC BLOOD PRESSURE: 116 MMHG | DIASTOLIC BLOOD PRESSURE: 59 MMHG

## 2022-10-24 VITALS
DIASTOLIC BLOOD PRESSURE: 63 MMHG | HEART RATE: 73 BPM | SYSTOLIC BLOOD PRESSURE: 116 MMHG | RESPIRATION RATE: 8 BRPM | OXYGEN SATURATION: 93 %

## 2022-10-24 VITALS
SYSTOLIC BLOOD PRESSURE: 126 MMHG | RESPIRATION RATE: 19 BRPM | OXYGEN SATURATION: 93 % | HEART RATE: 67 BPM | DIASTOLIC BLOOD PRESSURE: 65 MMHG

## 2022-10-24 VITALS
HEART RATE: 90 BPM | OXYGEN SATURATION: 94 % | RESPIRATION RATE: 18 BRPM | SYSTOLIC BLOOD PRESSURE: 125 MMHG | TEMPERATURE: 98.6 F | DIASTOLIC BLOOD PRESSURE: 67 MMHG

## 2022-10-24 VITALS
DIASTOLIC BLOOD PRESSURE: 59 MMHG | OXYGEN SATURATION: 95 % | RESPIRATION RATE: 12 BRPM | HEART RATE: 74 BPM | TEMPERATURE: 97 F | SYSTOLIC BLOOD PRESSURE: 108 MMHG

## 2022-10-24 VITALS
HEART RATE: 60 BPM | SYSTOLIC BLOOD PRESSURE: 125 MMHG | RESPIRATION RATE: 14 BRPM | OXYGEN SATURATION: 91 % | DIASTOLIC BLOOD PRESSURE: 66 MMHG

## 2022-10-24 VITALS
OXYGEN SATURATION: 94 % | HEART RATE: 86 BPM | DIASTOLIC BLOOD PRESSURE: 60 MMHG | SYSTOLIC BLOOD PRESSURE: 124 MMHG | TEMPERATURE: 98.2 F | RESPIRATION RATE: 18 BRPM

## 2022-10-24 VITALS
RESPIRATION RATE: 16 BRPM | OXYGEN SATURATION: 94 % | HEART RATE: 88 BPM | TEMPERATURE: 98.4 F | SYSTOLIC BLOOD PRESSURE: 154 MMHG | DIASTOLIC BLOOD PRESSURE: 84 MMHG

## 2022-10-24 VITALS
DIASTOLIC BLOOD PRESSURE: 59 MMHG | HEART RATE: 78 BPM | SYSTOLIC BLOOD PRESSURE: 106 MMHG | RESPIRATION RATE: 12 BRPM | OXYGEN SATURATION: 94 % | TEMPERATURE: 96.98 F

## 2022-10-24 VITALS
RESPIRATION RATE: 14 BRPM | SYSTOLIC BLOOD PRESSURE: 130 MMHG | OXYGEN SATURATION: 91 % | DIASTOLIC BLOOD PRESSURE: 76 MMHG | TEMPERATURE: 97.9 F | HEART RATE: 78 BPM

## 2022-10-24 VITALS
OXYGEN SATURATION: 93 % | TEMPERATURE: 96.98 F | DIASTOLIC BLOOD PRESSURE: 54 MMHG | SYSTOLIC BLOOD PRESSURE: 103 MMHG | RESPIRATION RATE: 10 BRPM | HEART RATE: 86 BPM

## 2022-10-24 VITALS
DIASTOLIC BLOOD PRESSURE: 67 MMHG | SYSTOLIC BLOOD PRESSURE: 110 MMHG | RESPIRATION RATE: 8 BRPM | OXYGEN SATURATION: 95 % | HEART RATE: 70 BPM

## 2022-10-24 VITALS
SYSTOLIC BLOOD PRESSURE: 104 MMHG | RESPIRATION RATE: 12 BRPM | TEMPERATURE: 96.98 F | OXYGEN SATURATION: 93 % | HEART RATE: 78 BPM | DIASTOLIC BLOOD PRESSURE: 56 MMHG

## 2022-10-24 VITALS
DIASTOLIC BLOOD PRESSURE: 54 MMHG | RESPIRATION RATE: 12 BRPM | HEART RATE: 86 BPM | TEMPERATURE: 96.98 F | OXYGEN SATURATION: 93 % | SYSTOLIC BLOOD PRESSURE: 103 MMHG

## 2022-10-24 VITALS
TEMPERATURE: 96.98 F | HEART RATE: 83 BPM | OXYGEN SATURATION: 94 % | SYSTOLIC BLOOD PRESSURE: 101 MMHG | RESPIRATION RATE: 12 BRPM | DIASTOLIC BLOOD PRESSURE: 52 MMHG

## 2022-10-24 VITALS
SYSTOLIC BLOOD PRESSURE: 105 MMHG | HEART RATE: 79 BPM | OXYGEN SATURATION: 94 % | DIASTOLIC BLOOD PRESSURE: 61 MMHG | RESPIRATION RATE: 9 BRPM

## 2022-10-24 VITALS
RESPIRATION RATE: 21 BRPM | DIASTOLIC BLOOD PRESSURE: 69 MMHG | OXYGEN SATURATION: 92 % | TEMPERATURE: 97.6 F | SYSTOLIC BLOOD PRESSURE: 145 MMHG | HEART RATE: 75 BPM

## 2022-10-24 VITALS
TEMPERATURE: 98.42 F | HEART RATE: 76 BPM | SYSTOLIC BLOOD PRESSURE: 140 MMHG | RESPIRATION RATE: 16 BRPM | OXYGEN SATURATION: 90 % | DIASTOLIC BLOOD PRESSURE: 81 MMHG

## 2022-10-24 VITALS
TEMPERATURE: 98.24 F | OXYGEN SATURATION: 95 % | DIASTOLIC BLOOD PRESSURE: 60 MMHG | HEART RATE: 88 BPM | SYSTOLIC BLOOD PRESSURE: 112 MMHG | RESPIRATION RATE: 18 BRPM

## 2022-10-24 VITALS
SYSTOLIC BLOOD PRESSURE: 126 MMHG | HEART RATE: 91 BPM | TEMPERATURE: 98.6 F | DIASTOLIC BLOOD PRESSURE: 69 MMHG | OXYGEN SATURATION: 94 % | RESPIRATION RATE: 18 BRPM

## 2022-10-24 VITALS
HEART RATE: 70 BPM | DIASTOLIC BLOOD PRESSURE: 63 MMHG | OXYGEN SATURATION: 95 % | SYSTOLIC BLOOD PRESSURE: 135 MMHG | RESPIRATION RATE: 22 BRPM

## 2022-10-24 VITALS
SYSTOLIC BLOOD PRESSURE: 105 MMHG | RESPIRATION RATE: 8 BRPM | DIASTOLIC BLOOD PRESSURE: 65 MMHG | OXYGEN SATURATION: 93 % | HEART RATE: 84 BPM

## 2022-10-24 VITALS
OXYGEN SATURATION: 93 % | DIASTOLIC BLOOD PRESSURE: 57 MMHG | RESPIRATION RATE: 12 BRPM | HEART RATE: 81 BPM | TEMPERATURE: 96.98 F | SYSTOLIC BLOOD PRESSURE: 103 MMHG

## 2022-10-24 VITALS
OXYGEN SATURATION: 96 % | SYSTOLIC BLOOD PRESSURE: 106 MMHG | DIASTOLIC BLOOD PRESSURE: 56 MMHG | HEART RATE: 75 BPM | RESPIRATION RATE: 12 BRPM | TEMPERATURE: 97 F

## 2022-10-24 VITALS
OXYGEN SATURATION: 94 % | DIASTOLIC BLOOD PRESSURE: 62 MMHG | HEART RATE: 75 BPM | TEMPERATURE: 97.88 F | RESPIRATION RATE: 12 BRPM | SYSTOLIC BLOOD PRESSURE: 106 MMHG

## 2022-10-24 VITALS
HEART RATE: 74 BPM | DIASTOLIC BLOOD PRESSURE: 66 MMHG | RESPIRATION RATE: 19 BRPM | SYSTOLIC BLOOD PRESSURE: 120 MMHG | OXYGEN SATURATION: 93 %

## 2022-10-24 VITALS
HEART RATE: 72 BPM | DIASTOLIC BLOOD PRESSURE: 66 MMHG | RESPIRATION RATE: 19 BRPM | OXYGEN SATURATION: 96 % | TEMPERATURE: 98 F | SYSTOLIC BLOOD PRESSURE: 122 MMHG

## 2022-10-24 VITALS
DIASTOLIC BLOOD PRESSURE: 70 MMHG | OXYGEN SATURATION: 94 % | HEART RATE: 67 BPM | RESPIRATION RATE: 17 BRPM | SYSTOLIC BLOOD PRESSURE: 135 MMHG

## 2022-10-24 VITALS
OXYGEN SATURATION: 91 % | TEMPERATURE: 97.88 F | HEART RATE: 71 BPM | DIASTOLIC BLOOD PRESSURE: 36 MMHG | SYSTOLIC BLOOD PRESSURE: 70 MMHG | RESPIRATION RATE: 11 BRPM

## 2022-10-24 VITALS
RESPIRATION RATE: 9 BRPM | SYSTOLIC BLOOD PRESSURE: 127 MMHG | DIASTOLIC BLOOD PRESSURE: 78 MMHG | HEART RATE: 77 BPM | OXYGEN SATURATION: 93 %

## 2022-10-24 VITALS
OXYGEN SATURATION: 94 % | HEART RATE: 74 BPM | RESPIRATION RATE: 12 BRPM | DIASTOLIC BLOOD PRESSURE: 58 MMHG | TEMPERATURE: 97.8 F | SYSTOLIC BLOOD PRESSURE: 117 MMHG

## 2022-10-24 DIAGNOSIS — I48.20: ICD-10-CM
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
## 2022-10-24 DIAGNOSIS — C73: ICD-10-CM

## 2022-10-24 DIAGNOSIS — F41.9: ICD-10-CM

## 2022-10-24 DIAGNOSIS — F03.90: ICD-10-CM

## 2022-10-24 DIAGNOSIS — Z95.0: ICD-10-CM

## 2022-10-24 DIAGNOSIS — K74.60: ICD-10-CM

## 2022-10-24 DIAGNOSIS — I10: ICD-10-CM

## 2022-10-24 DIAGNOSIS — N39.0: ICD-10-CM

## 2022-10-24 DIAGNOSIS — I25.112: ICD-10-CM

## 2022-10-24 DIAGNOSIS — Z87.891: ICD-10-CM

## 2022-10-24 DIAGNOSIS — D69.6: ICD-10-CM

## 2022-10-24 DIAGNOSIS — E03.9: ICD-10-CM

## 2022-10-24 DIAGNOSIS — S72.012A: Primary | ICD-10-CM

## 2022-10-24 DIAGNOSIS — Z79.01: ICD-10-CM

## 2022-10-24 LAB
ALBUMIN LEVEL: 3.5 G/DL (ref 3.5–5)
ALBUMIN/GLOB SERPL: 1.5 {RATIO} (ref 1.1–1.8)
ALP ISO SERPL-ACNC: 161 U/L (ref 38–126)
ALT SERPLBLD-CCNC: 53 U/L (ref 12–78)
AMORPH SED URNS QL MICRO: (no result) /LPF
ANION GAP SERPL CALC-SCNC: 13.3 MEQ/L (ref 5–15)
AST SERPL QL: 62 U/L (ref 14–36)
BACTERIA URNS QL MICRO: (no result) /LPF
BILIRUBIN,TOTAL: 2.1 MG/DL (ref 0.2–1.3)
BUN SERPL-MCNC: 21 MG/DL (ref 7–17)
CALCIUM SPEC-MCNC: 8.7 MG/DL (ref 8.4–10.2)
CHLORIDE SPEC-SCNC: 109 MMOL/L (ref 98–107)
CK SERPL-CCNC: 60 U/L (ref 30–135)
CO2 BLDCOA-SCNC: 23.3 MMHG (ref 23–27)
CO2 SERPL-SCNC: 23 MMOL/L (ref 22–30)
COLOR UR: (no result)
CREAT BLD-SCNC: 0.9 MG/DL (ref 0.52–1.04)
ESTIMATED GLOMERULAR FILT RATE: 60 ML/MIN (ref 60–?)
GFR (AFRICAN AMERICAN): 73 ML/MIN (ref 60–?)
GLOBULIN SER CALC-MCNC: 2.3 G/DL (ref 1.3–3.2)
GLUCOSE: 96 MG/DL (ref 74–100)
HCO3 BLDA-SCNC: 22 MMHG (ref 22–26)
HCT VFR BLD CALC: 39.2 % (ref 37–47)
HGB BLD-MCNC: 12.6 G/DL (ref 12.2–16.2)
INR PPP: 1.28 (ref 0.9–1.1)
LEUKOCYTE ESTERASE UR QL STRIP: (no result)
MCHC RBC-ENTMCNC: 32.1 G/DL (ref 31.8–35.4)
MCV RBC: 111.5 FL (ref 81–99)
MEAN CORPUSCULAR HEMOGLOBIN: 35.8 PG (ref 27–31.2)
MICRO URNS: (no result)
PCO2 BLDA: 44.2 MMHG (ref 35–45)
PH BLDA: 7.31 MMOL/L (ref 7.35–7.45)
PH UR: 8.5 [PH] (ref 5–8.5)
PLATELET # BLD: 67 K/MM3 (ref 142–424)
PO2 BLDA: 74.8 MMHG (ref 80–100)
POTASSIUM: 4.3 MMOL/L (ref 3.5–5.1)
PROT SERPL-MCNC: 5.8 G/DL (ref 6.3–8.2)
PROT UR STRIP-MCNC: (no result) MG/DL
PT BLD: 13.6 SECONDS (ref 10.1–12.5)
RBC # BLD AUTO: 3.52 M/MM3 (ref 4.2–5.4)
RBC #/AREA URNS HPF: (no result) #/HPF (ref 0–3)
SODIUM SPEC-SCNC: 141 MMOL/L (ref 136–145)
SP GR UR: 1.01 (ref 1–1.03)
SQUAMOUS URNS QL MICRO: (no result) #/HPF (ref 0–5)
UROBILINOGEN UR QL: 1 EU/DL
WBC # BLD AUTO: 4.8 K/MM3 (ref 4.8–10.8)

## 2022-10-24 PROCEDURE — 80053 COMPREHEN METABOLIC PANEL: CPT

## 2022-10-24 PROCEDURE — 87086 URINE CULTURE/COLONY COUNT: CPT

## 2022-10-24 PROCEDURE — 81001 URINALYSIS AUTO W/SCOPE: CPT

## 2022-10-24 PROCEDURE — 85025 COMPLETE CBC W/AUTO DIFF WBC: CPT

## 2022-10-24 PROCEDURE — 93005 ELECTROCARDIOGRAM TRACING: CPT

## 2022-10-24 PROCEDURE — 87186 SC STD MICRODIL/AGAR DIL: CPT

## 2022-10-24 PROCEDURE — 97163 PT EVAL HIGH COMPLEX 45 MIN: CPT

## 2022-10-24 PROCEDURE — 97530 THERAPEUTIC ACTIVITIES: CPT

## 2022-10-24 PROCEDURE — 73700 CT LOWER EXTREMITY W/O DYE: CPT

## 2022-10-24 PROCEDURE — 85610 PROTHROMBIN TIME: CPT

## 2022-10-24 PROCEDURE — 27245 TREAT THIGH FRACTURE: CPT

## 2022-10-24 PROCEDURE — C1769 GUIDE WIRE: HCPCS

## 2022-10-24 PROCEDURE — C1713 ANCHOR/SCREW BN/BN,TIS/BN: HCPCS

## 2022-10-24 PROCEDURE — C1776 JOINT DEVICE (IMPLANTABLE): HCPCS

## 2022-10-24 PROCEDURE — 99285 EMERGENCY DEPT VISIT HI MDM: CPT

## 2022-10-24 PROCEDURE — 87088 URINE BACTERIA CULTURE: CPT

## 2022-10-24 PROCEDURE — 82550 ASSAY OF CK (CPK): CPT

## 2022-10-24 PROCEDURE — 93306 TTE W/DOPPLER COMPLETE: CPT

## 2022-10-24 PROCEDURE — 51702 INSERT TEMP BLADDER CATH: CPT

## 2022-10-24 PROCEDURE — U0003 INFECTIOUS AGENT DETECTION BY NUCLEIC ACID (DNA OR RNA); SEVERE ACUTE RESPIRATORY SYNDROME CORONAVIRUS 2 (SARS-COV-2) (CORONAVIRUS DISEASE [COVID-19]), AMPLIFIED PROBE TECHNIQUE, MAKING USE OF HIGH THROUGHPUT TECHNOLOGIES AS DESCRIBED BY CMS-2020-01-R: HCPCS

## 2022-10-24 PROCEDURE — 76000 FLUOROSCOPY <1 HR PHYS/QHP: CPT

## 2022-10-24 PROCEDURE — P9034 PLATELETS, PHERESIS: HCPCS

## 2022-10-24 PROCEDURE — C9803 HOPD COVID-19 SPEC COLLECT: HCPCS

## 2022-10-24 PROCEDURE — 86900 BLOOD TYPING SEROLOGIC ABO: CPT

## 2022-10-24 PROCEDURE — 82803 BLOOD GASES ANY COMBINATION: CPT

## 2022-10-24 PROCEDURE — 71045 X-RAY EXAM CHEST 1 VIEW: CPT

## 2022-10-24 PROCEDURE — 73502 X-RAY EXAM HIP UNI 2-3 VIEWS: CPT

## 2022-10-24 PROCEDURE — 86901 BLOOD TYPING SEROLOGIC RH(D): CPT

## 2022-10-24 PROCEDURE — 36415 COLL VENOUS BLD VENIPUNCTURE: CPT

## 2022-10-24 PROCEDURE — U0005 INFEC AGEN DETEC AMPLI PROBE: HCPCS

## 2022-10-24 PROCEDURE — 0QH706Z INSERTION OF INTRAMEDULLARY INTERNAL FIXATION DEVICE INTO LEFT UPPER FEMUR, OPEN APPROACH: ICD-10-PCS | Performed by: ORTHOPAEDIC SURGERY

## 2022-10-24 NOTE — SUR.PHASEI
2148 T. Severs, CRNA notified of ABG results. Pt is okay to be transported via bed to med/surg room per T. Severs, CRNA.

## 2022-10-24 NOTE — PC.NURSE
report taken from LESTER Parham; pt in OR at this time, family in OR waiting room; awaiting report after surgery completed

## 2022-10-24 NOTE — PC.NURSE
notified SANTA Rosas of pt's hypotension, 1L bolus ordered, but took bp prior to starting bolus and was back within normal limits, SANTA Rosas stated to hold on bolus at this time

## 2022-10-24 NOTE — PC.NURSE
pt's daughter Yen is POA and during admission stated pt is a DNR, Yen signed paperwork with this RN present, this RN placed DNR paperwork in chart and placed purple band on pt's arm; notified SANTA Rosas that pt is DNR

## 2022-10-24 NOTE — SUR.PHASEI
2123 spoke to T. Severs, CRNA regarding pt only responding to name. pt is not answering questions. T. Severs, CRNA ordered ABG to be performed. Order was placed and respiratory notified.

## 2022-10-25 VITALS
DIASTOLIC BLOOD PRESSURE: 52 MMHG | RESPIRATION RATE: 20 BRPM | OXYGEN SATURATION: 94 % | SYSTOLIC BLOOD PRESSURE: 106 MMHG | HEART RATE: 80 BPM

## 2022-10-25 VITALS — HEART RATE: 70 BPM

## 2022-10-25 VITALS
HEART RATE: 83 BPM | SYSTOLIC BLOOD PRESSURE: 115 MMHG | OXYGEN SATURATION: 95 % | RESPIRATION RATE: 13 BRPM | DIASTOLIC BLOOD PRESSURE: 55 MMHG

## 2022-10-25 VITALS
DIASTOLIC BLOOD PRESSURE: 68 MMHG | HEART RATE: 84 BPM | SYSTOLIC BLOOD PRESSURE: 122 MMHG | RESPIRATION RATE: 17 BRPM | OXYGEN SATURATION: 96 % | TEMPERATURE: 97.7 F

## 2022-10-25 VITALS
RESPIRATION RATE: 17 BRPM | TEMPERATURE: 97.8 F | OXYGEN SATURATION: 92 % | DIASTOLIC BLOOD PRESSURE: 55 MMHG | HEART RATE: 80 BPM | SYSTOLIC BLOOD PRESSURE: 106 MMHG

## 2022-10-25 VITALS — SYSTOLIC BLOOD PRESSURE: 106 MMHG | TEMPERATURE: 97.88 F | HEART RATE: 75 BPM | DIASTOLIC BLOOD PRESSURE: 62 MMHG

## 2022-10-25 VITALS
OXYGEN SATURATION: 94 % | RESPIRATION RATE: 22 BRPM | HEART RATE: 72 BPM | SYSTOLIC BLOOD PRESSURE: 113 MMHG | DIASTOLIC BLOOD PRESSURE: 59 MMHG | TEMPERATURE: 97.52 F

## 2022-10-25 VITALS
OXYGEN SATURATION: 95 % | DIASTOLIC BLOOD PRESSURE: 62 MMHG | SYSTOLIC BLOOD PRESSURE: 123 MMHG | RESPIRATION RATE: 12 BRPM | HEART RATE: 81 BPM

## 2022-10-25 VITALS
OXYGEN SATURATION: 92 % | SYSTOLIC BLOOD PRESSURE: 101 MMHG | DIASTOLIC BLOOD PRESSURE: 56 MMHG | TEMPERATURE: 99.4 F | HEART RATE: 82 BPM | RESPIRATION RATE: 18 BRPM

## 2022-10-25 VITALS
SYSTOLIC BLOOD PRESSURE: 100 MMHG | HEART RATE: 73 BPM | DIASTOLIC BLOOD PRESSURE: 60 MMHG | RESPIRATION RATE: 10 BRPM | OXYGEN SATURATION: 94 %

## 2022-10-25 VITALS
OXYGEN SATURATION: 96 % | SYSTOLIC BLOOD PRESSURE: 115 MMHG | HEART RATE: 77 BPM | RESPIRATION RATE: 22 BRPM | DIASTOLIC BLOOD PRESSURE: 58 MMHG

## 2022-10-25 VITALS — TEMPERATURE: 98.96 F

## 2022-10-25 VITALS
RESPIRATION RATE: 11 BRPM | DIASTOLIC BLOOD PRESSURE: 59 MMHG | OXYGEN SATURATION: 94 % | SYSTOLIC BLOOD PRESSURE: 114 MMHG | HEART RATE: 77 BPM

## 2022-10-25 VITALS
OXYGEN SATURATION: 92 % | SYSTOLIC BLOOD PRESSURE: 127 MMHG | RESPIRATION RATE: 10 BRPM | HEART RATE: 84 BPM | DIASTOLIC BLOOD PRESSURE: 99 MMHG

## 2022-10-25 VITALS — TEMPERATURE: 97.88 F

## 2022-10-25 VITALS — HEART RATE: 79 BPM

## 2022-10-25 VITALS — HEART RATE: 80 BPM

## 2022-10-25 VITALS — HEART RATE: 83 BPM

## 2022-10-25 LAB
HCT VFR BLD CALC: 30.8 % (ref 37–47)
HGB BLD-MCNC: 10.1 G/DL (ref 12.2–16.2)
MCHC RBC-ENTMCNC: 32.7 G/DL (ref 31.8–35.4)
MCV RBC: 107.2 FL (ref 81–99)
MEAN CORPUSCULAR HEMOGLOBIN: 35.1 PG (ref 27–31.2)
PLATELET # BLD: 91 K/MM3 (ref 142–424)
RBC # BLD AUTO: 2.87 M/MM3 (ref 4.2–5.4)
WBC # BLD AUTO: 9.7 K/MM3 (ref 4.8–10.8)

## 2022-10-25 NOTE — PC.NURSE
pt was up to the chair for a few hours and was returned to bed by physical therapy early this afternoon. pt is alert to self and birthday only but her memory is not intact. pt was found in tears this early afternoon and when asked what was wrong she

## 2022-10-26 VITALS
SYSTOLIC BLOOD PRESSURE: 110 MMHG | HEART RATE: 68 BPM | OXYGEN SATURATION: 93 % | RESPIRATION RATE: 16 BRPM | TEMPERATURE: 98.06 F | DIASTOLIC BLOOD PRESSURE: 55 MMHG

## 2022-10-26 VITALS
SYSTOLIC BLOOD PRESSURE: 109 MMHG | OXYGEN SATURATION: 94 % | RESPIRATION RATE: 16 BRPM | HEART RATE: 80 BPM | TEMPERATURE: 98.1 F | DIASTOLIC BLOOD PRESSURE: 51 MMHG

## 2022-10-26 VITALS
DIASTOLIC BLOOD PRESSURE: 57 MMHG | SYSTOLIC BLOOD PRESSURE: 103 MMHG | RESPIRATION RATE: 18 BRPM | HEART RATE: 84 BPM | OXYGEN SATURATION: 91 % | TEMPERATURE: 99 F

## 2022-10-26 VITALS
SYSTOLIC BLOOD PRESSURE: 107 MMHG | TEMPERATURE: 97.88 F | OXYGEN SATURATION: 96 % | RESPIRATION RATE: 17 BRPM | HEART RATE: 82 BPM | DIASTOLIC BLOOD PRESSURE: 59 MMHG

## 2022-10-26 VITALS — HEART RATE: 93 BPM

## 2022-10-26 VITALS
DIASTOLIC BLOOD PRESSURE: 54 MMHG | SYSTOLIC BLOOD PRESSURE: 105 MMHG | TEMPERATURE: 98.6 F | RESPIRATION RATE: 20 BRPM | OXYGEN SATURATION: 92 % | HEART RATE: 96 BPM

## 2022-10-26 VITALS — HEART RATE: 70 BPM

## 2022-10-27 NOTE — CARE MANAGER
Attempted to contact [facility] to check on patient and was left on hold for extended amount of time.

## 2022-11-01 ENCOUNTER — HOSPITAL ENCOUNTER (EMERGENCY)
Age: 82
LOS: 1 days | Discharge: NURSING, REHAB OR LONG-TERM CARE | End: 2022-11-02
Payer: MEDICARE

## 2022-11-01 VITALS
HEART RATE: 96 BPM | RESPIRATION RATE: 16 BRPM | BODY MASS INDEX: 32.3 KG/M2 | SYSTOLIC BLOOD PRESSURE: 135 MMHG | TEMPERATURE: 98.2 F | DIASTOLIC BLOOD PRESSURE: 79 MMHG | OXYGEN SATURATION: 96 %

## 2022-11-01 VITALS — DIASTOLIC BLOOD PRESSURE: 77 MMHG | SYSTOLIC BLOOD PRESSURE: 128 MMHG | OXYGEN SATURATION: 96 % | HEART RATE: 70 BPM

## 2022-11-01 DIAGNOSIS — F03.90: ICD-10-CM

## 2022-11-01 DIAGNOSIS — Z88.2: ICD-10-CM

## 2022-11-01 DIAGNOSIS — E89.0: ICD-10-CM

## 2022-11-01 DIAGNOSIS — Z95.0: ICD-10-CM

## 2022-11-01 DIAGNOSIS — S72.002A: ICD-10-CM

## 2022-11-01 DIAGNOSIS — I48.91: ICD-10-CM

## 2022-11-01 DIAGNOSIS — Z79.899: ICD-10-CM

## 2022-11-01 DIAGNOSIS — R60.0: Primary | ICD-10-CM

## 2022-11-01 DIAGNOSIS — I25.10: ICD-10-CM

## 2022-11-01 DIAGNOSIS — I10: ICD-10-CM

## 2022-11-01 DIAGNOSIS — Z88.1: ICD-10-CM

## 2022-11-01 DIAGNOSIS — Z96.642: ICD-10-CM

## 2022-11-01 LAB
ALBUMIN LEVEL: 3.1 G/DL (ref 3.5–5)
ALBUMIN/GLOB SERPL: 1.3 {RATIO} (ref 1.1–1.8)
ALP ISO SERPL-ACNC: 149 U/L (ref 38–126)
ALT SERPLBLD-CCNC: 49 U/L (ref 12–78)
AMMONIA: < 9 UMOL/L (ref 9–30)
ANION GAP SERPL CALC-SCNC: 9.2 MEQ/L (ref 5–15)
AST SERPL QL: 60 U/L (ref 14–36)
BILIRUBIN,TOTAL: 3.3 MG/DL (ref 0.2–1.3)
BUN SERPL-MCNC: 15 MG/DL (ref 7–17)
CALCIUM SPEC-MCNC: 8.3 MG/DL (ref 8.4–10.2)
CHLORIDE SPEC-SCNC: 101 MMOL/L (ref 98–107)
CO2 SERPL-SCNC: 28 MMOL/L (ref 22–30)
CREAT BLD-SCNC: 0.7 MG/DL (ref 0.52–1.04)
CREATININE CLEARANCE ESTIMATED: 63 ML/MIN (ref 50–200)
CRP SERPL HS-MCNC: 35 MG/L (ref 0–4)
ESTIMATED GLOMERULAR FILT RATE: 80 ML/MIN (ref 60–?)
GFR (AFRICAN AMERICAN): 97 ML/MIN (ref 60–?)
GLOBULIN SER CALC-MCNC: 2.3 G/DL (ref 1.3–3.2)
GLUCOSE: 108 MG/DL (ref 74–100)
HCT VFR BLD CALC: 29 % (ref 37–47)
HGB BLD-MCNC: 10 G/DL (ref 12.2–16.2)
MCHC RBC-ENTMCNC: 34.6 G/DL (ref 31.8–35.4)
MCV RBC: 105 FL (ref 81–99)
MEAN CORPUSCULAR HEMOGLOBIN: 36.3 PG (ref 27–31.2)
PLATELET # BLD: 121 K/MM3 (ref 142–424)
POTASSIUM: 3.2 MMOL/L (ref 3.5–5.1)
PROT SERPL-MCNC: 5.4 G/DL (ref 6.3–8.2)
RBC # BLD AUTO: 2.76 M/MM3 (ref 4.2–5.4)
SODIUM SPEC-SCNC: 135 MMOL/L (ref 136–145)
WBC # BLD AUTO: 6.4 K/MM3 (ref 4.8–10.8)

## 2022-11-01 PROCEDURE — 85025 COMPLETE CBC W/AUTO DIFF WBC: CPT

## 2022-11-01 PROCEDURE — 86140 C-REACTIVE PROTEIN: CPT

## 2022-11-01 PROCEDURE — 71045 X-RAY EXAM CHEST 1 VIEW: CPT

## 2022-11-01 PROCEDURE — 99283 EMERGENCY DEPT VISIT LOW MDM: CPT

## 2022-11-01 PROCEDURE — 83605 ASSAY OF LACTIC ACID: CPT

## 2022-11-01 PROCEDURE — 80053 COMPREHEN METABOLIC PANEL: CPT

## 2022-11-01 PROCEDURE — 85651 RBC SED RATE NONAUTOMATED: CPT

## 2022-11-01 PROCEDURE — 87040 BLOOD CULTURE FOR BACTERIA: CPT

## 2022-11-01 PROCEDURE — 82140 ASSAY OF AMMONIA: CPT

## 2022-11-02 ENCOUNTER — HOSPITAL ENCOUNTER (OUTPATIENT)
Age: 82
End: 2022-11-02
Payer: MEDICARE

## 2022-11-02 VITALS
SYSTOLIC BLOOD PRESSURE: 134 MMHG | TEMPERATURE: 98.24 F | RESPIRATION RATE: 18 BRPM | HEART RATE: 72 BPM | OXYGEN SATURATION: 96 % | DIASTOLIC BLOOD PRESSURE: 78 MMHG

## 2022-11-02 DIAGNOSIS — R60.0: Primary | ICD-10-CM

## 2022-11-02 PROCEDURE — 93970 EXTREMITY STUDY: CPT

## 2022-11-15 ENCOUNTER — HOSPITAL ENCOUNTER (INPATIENT)
Dept: HOSPITAL 22 - 2ND | Age: 82
LOS: 8 days | Discharge: NURSING, REHAB OR LONG-TERM CARE | DRG: 862 | End: 2022-11-23
Payer: MEDICARE

## 2022-11-15 ENCOUNTER — HOSPITAL ENCOUNTER (OUTPATIENT)
Age: 82
End: 2022-11-15
Payer: MEDICARE

## 2022-11-15 VITALS
OXYGEN SATURATION: 96 % | DIASTOLIC BLOOD PRESSURE: 83 MMHG | HEART RATE: 97 BPM | RESPIRATION RATE: 20 BRPM | TEMPERATURE: 99.9 F | SYSTOLIC BLOOD PRESSURE: 161 MMHG

## 2022-11-15 VITALS
BODY MASS INDEX: 31.2 KG/M2 | BODY MASS INDEX: 31.4 KG/M2 | BODY MASS INDEX: 31 KG/M2 | BODY MASS INDEX: 31.6 KG/M2 | BODY MASS INDEX: 30.6 KG/M2

## 2022-11-15 VITALS
SYSTOLIC BLOOD PRESSURE: 177 MMHG | HEART RATE: 75 BPM | RESPIRATION RATE: 16 BRPM | OXYGEN SATURATION: 95 % | DIASTOLIC BLOOD PRESSURE: 94 MMHG | TEMPERATURE: 98.6 F

## 2022-11-15 DIAGNOSIS — S72.002D: ICD-10-CM

## 2022-11-15 DIAGNOSIS — L03.116: ICD-10-CM

## 2022-11-15 DIAGNOSIS — Z85.850: ICD-10-CM

## 2022-11-15 DIAGNOSIS — B95.2: ICD-10-CM

## 2022-11-15 DIAGNOSIS — I25.10: ICD-10-CM

## 2022-11-15 DIAGNOSIS — Z96.642: ICD-10-CM

## 2022-11-15 DIAGNOSIS — F03.90: ICD-10-CM

## 2022-11-15 DIAGNOSIS — E89.2: ICD-10-CM

## 2022-11-15 DIAGNOSIS — E87.6: ICD-10-CM

## 2022-11-15 DIAGNOSIS — I48.20: ICD-10-CM

## 2022-11-15 DIAGNOSIS — T81.49XA: Primary | ICD-10-CM

## 2022-11-15 DIAGNOSIS — A04.71: ICD-10-CM

## 2022-11-15 DIAGNOSIS — T81.49XA: ICD-10-CM

## 2022-11-15 DIAGNOSIS — E03.9: ICD-10-CM

## 2022-11-15 DIAGNOSIS — S72.002A: Primary | ICD-10-CM

## 2022-11-15 DIAGNOSIS — M25.552: ICD-10-CM

## 2022-11-15 DIAGNOSIS — R60.9: ICD-10-CM

## 2022-11-15 DIAGNOSIS — Z96.652: ICD-10-CM

## 2022-11-15 LAB
ALBUMIN LEVEL: 3.1 G/DL (ref 3.5–5)
ALBUMIN/GLOB SERPL: 1.1 {RATIO} (ref 1.1–1.8)
ALP ISO SERPL-ACNC: 252 U/L (ref 38–126)
ALT SERPLBLD-CCNC: 48 U/L (ref 12–78)
ANION GAP SERPL CALC-SCNC: 12 MEQ/L (ref 5–15)
AST SERPL QL: 50 U/L (ref 14–36)
BILIRUBIN,TOTAL: 3.3 MG/DL (ref 0.2–1.3)
BUN SERPL-MCNC: 13 MG/DL (ref 7–17)
CALCIUM SPEC-MCNC: 8.5 MG/DL (ref 8.4–10.2)
CELLS COUNTED: 100
CHLORIDE SPEC-SCNC: 97 MMOL/L (ref 98–107)
CO2 SERPL-SCNC: 29 MMOL/L (ref 22–30)
CREAT BLD-SCNC: 0.8 MG/DL (ref 0.52–1.04)
CREATININE CLEARANCE ESTIMATED: 61 ML/MIN (ref 50–200)
CRP SERPL HS-MCNC: 84.4 MG/L (ref 0–4)
DACRYOCYTES BLD QL SMEAR: (no result)
ESTIMATED GLOMERULAR FILT RATE: 69 ML/MIN (ref 60–?)
GFR (AFRICAN AMERICAN): 83 ML/MIN (ref 60–?)
GLOBULIN SER CALC-MCNC: 2.8 G/DL (ref 1.3–3.2)
GLUCOSE: 102 MG/DL (ref 74–100)
HCT VFR BLD CALC: 37.5 % (ref 37–47)
HGB BLD-MCNC: 12.6 G/DL (ref 12.2–16.2)
INR PPP: 1.37 (ref 0.9–1.1)
MAGNESIUM: 1.6 MG/DL (ref 1.6–2.3)
MANUAL DIFFERENTIAL: (no result)
MCHC RBC-ENTMCNC: 33.5 G/DL (ref 31.8–35.4)
MCV RBC: 106.2 FL (ref 81–99)
MEAN CORPUSCULAR HEMOGLOBIN: 35.5 PG (ref 27–31.2)
PLATELET # BLD: 205 K/MM3 (ref 142–424)
POTASSIUM: 3 MMOL/L (ref 3.5–5.1)
PROT SERPL-MCNC: 5.9 G/DL (ref 6.3–8.2)
PT BLD: 14.5 SECONDS (ref 10.1–12.5)
RBC # BLD AUTO: 3.53 M/MM3 (ref 4.2–5.4)
SODIUM SPEC-SCNC: 135 MMOL/L (ref 136–145)
WBC # BLD AUTO: 15.9 K/MM3 (ref 4.8–10.8)

## 2022-11-15 PROCEDURE — 87086 URINE CULTURE/COLONY COUNT: CPT

## 2022-11-15 PROCEDURE — 83735 ASSAY OF MAGNESIUM: CPT

## 2022-11-15 PROCEDURE — 80048 BASIC METABOLIC PNL TOTAL CA: CPT

## 2022-11-15 PROCEDURE — 87077 CULTURE AEROBIC IDENTIFY: CPT

## 2022-11-15 PROCEDURE — 71045 X-RAY EXAM CHEST 1 VIEW: CPT

## 2022-11-15 PROCEDURE — 86140 C-REACTIVE PROTEIN: CPT

## 2022-11-15 PROCEDURE — 10180 I&D COMPLEX PO WOUND INFCTJ: CPT

## 2022-11-15 PROCEDURE — 84100 ASSAY OF PHOSPHORUS: CPT

## 2022-11-15 PROCEDURE — 85025 COMPLETE CBC W/AUTO DIFF WBC: CPT

## 2022-11-15 PROCEDURE — 84484 ASSAY OF TROPONIN QUANT: CPT

## 2022-11-15 PROCEDURE — 97116 GAIT TRAINING THERAPY: CPT

## 2022-11-15 PROCEDURE — 87205 SMEAR GRAM STAIN: CPT

## 2022-11-15 PROCEDURE — 11981 INSERTION DRUG DLVR IMPLANT: CPT

## 2022-11-15 PROCEDURE — U0005 INFEC AGEN DETEC AMPLI PROBE: HCPCS

## 2022-11-15 PROCEDURE — 80202 ASSAY OF VANCOMYCIN: CPT

## 2022-11-15 PROCEDURE — 87040 BLOOD CULTURE FOR BACTERIA: CPT

## 2022-11-15 PROCEDURE — 85610 PROTHROMBIN TIME: CPT

## 2022-11-15 PROCEDURE — 36415 COLL VENOUS BLD VENIPUNCTURE: CPT

## 2022-11-15 PROCEDURE — 80053 COMPREHEN METABOLIC PANEL: CPT

## 2022-11-15 PROCEDURE — 97530 THERAPEUTIC ACTIVITIES: CPT

## 2022-11-15 PROCEDURE — 36569 INSJ PICC 5 YR+ W/O IMAGING: CPT

## 2022-11-15 PROCEDURE — 36410 VNPNXR 3YR/> PHY/QHP DX/THER: CPT

## 2022-11-15 PROCEDURE — 87186 SC STD MICRODIL/AGAR DIL: CPT

## 2022-11-15 PROCEDURE — C1751 CATH, INF, PER/CENT/MIDLINE: HCPCS

## 2022-11-15 PROCEDURE — C9803 HOPD COVID-19 SPEC COLLECT: HCPCS

## 2022-11-15 PROCEDURE — 87075 CULTR BACTERIA EXCEPT BLOOD: CPT

## 2022-11-15 PROCEDURE — 81001 URINALYSIS AUTO W/SCOPE: CPT

## 2022-11-15 PROCEDURE — 73610 X-RAY EXAM OF ANKLE: CPT

## 2022-11-15 PROCEDURE — 73702 CT LWR EXTREMITY W/O&W/DYE: CPT

## 2022-11-15 PROCEDURE — 97110 THERAPEUTIC EXERCISES: CPT

## 2022-11-15 PROCEDURE — 97165 OT EVAL LOW COMPLEX 30 MIN: CPT

## 2022-11-15 PROCEDURE — 87506 IADNA-DNA/RNA PROBE TQ 6-11: CPT

## 2022-11-15 PROCEDURE — 85651 RBC SED RATE NONAUTOMATED: CPT

## 2022-11-15 PROCEDURE — 87070 CULTURE OTHR SPECIMN AEROBIC: CPT

## 2022-11-15 PROCEDURE — U0003 INFECTIOUS AGENT DETECTION BY NUCLEIC ACID (DNA OR RNA); SEVERE ACUTE RESPIRATORY SYNDROME CORONAVIRUS 2 (SARS-COV-2) (CORONAVIRUS DISEASE [COVID-19]), AMPLIFIED PROBE TECHNIQUE, MAKING USE OF HIGH THROUGHPUT TECHNOLOGIES AS DESCRIBED BY CMS-2020-01-R: HCPCS

## 2022-11-15 PROCEDURE — A4649 SURGICAL SUPPLIES: HCPCS

## 2022-11-15 PROCEDURE — 73502 X-RAY EXAM HIP UNI 2-3 VIEWS: CPT

## 2022-11-15 PROCEDURE — 97162 PT EVAL MOD COMPLEX 30 MIN: CPT

## 2022-11-15 PROCEDURE — 85007 BL SMEAR W/DIFF WBC COUNT: CPT

## 2022-11-16 VITALS
HEART RATE: 75 BPM | TEMPERATURE: 98.06 F | OXYGEN SATURATION: 95 % | SYSTOLIC BLOOD PRESSURE: 115 MMHG | RESPIRATION RATE: 18 BRPM | DIASTOLIC BLOOD PRESSURE: 64 MMHG

## 2022-11-16 VITALS
TEMPERATURE: 98.06 F | HEART RATE: 68 BPM | DIASTOLIC BLOOD PRESSURE: 63 MMHG | RESPIRATION RATE: 18 BRPM | OXYGEN SATURATION: 96 % | SYSTOLIC BLOOD PRESSURE: 114 MMHG

## 2022-11-16 VITALS
TEMPERATURE: 98.96 F | OXYGEN SATURATION: 95 % | RESPIRATION RATE: 17 BRPM | SYSTOLIC BLOOD PRESSURE: 102 MMHG | HEART RATE: 86 BPM | DIASTOLIC BLOOD PRESSURE: 58 MMHG

## 2022-11-16 VITALS — HEART RATE: 70 BPM

## 2022-11-16 VITALS
DIASTOLIC BLOOD PRESSURE: 74 MMHG | OXYGEN SATURATION: 98 % | RESPIRATION RATE: 18 BRPM | HEART RATE: 72 BPM | SYSTOLIC BLOOD PRESSURE: 122 MMHG | TEMPERATURE: 97.6 F

## 2022-11-16 VITALS
RESPIRATION RATE: 16 BRPM | OXYGEN SATURATION: 95 % | TEMPERATURE: 97.4 F | HEART RATE: 80 BPM | DIASTOLIC BLOOD PRESSURE: 59 MMHG | SYSTOLIC BLOOD PRESSURE: 91 MMHG

## 2022-11-16 VITALS
DIASTOLIC BLOOD PRESSURE: 62 MMHG | HEART RATE: 77 BPM | OXYGEN SATURATION: 97 % | TEMPERATURE: 98.2 F | RESPIRATION RATE: 16 BRPM | SYSTOLIC BLOOD PRESSURE: 104 MMHG

## 2022-11-16 VITALS
RESPIRATION RATE: 16 BRPM | OXYGEN SATURATION: 97 % | TEMPERATURE: 97.9 F | HEART RATE: 73 BPM | DIASTOLIC BLOOD PRESSURE: 69 MMHG | SYSTOLIC BLOOD PRESSURE: 128 MMHG

## 2022-11-16 VITALS
TEMPERATURE: 98.06 F | RESPIRATION RATE: 18 BRPM | SYSTOLIC BLOOD PRESSURE: 106 MMHG | HEART RATE: 71 BPM | DIASTOLIC BLOOD PRESSURE: 51 MMHG | OXYGEN SATURATION: 97 %

## 2022-11-16 VITALS
SYSTOLIC BLOOD PRESSURE: 122 MMHG | HEART RATE: 78 BPM | RESPIRATION RATE: 20 BRPM | DIASTOLIC BLOOD PRESSURE: 74 MMHG | TEMPERATURE: 98.1 F | OXYGEN SATURATION: 98 %

## 2022-11-16 VITALS
DIASTOLIC BLOOD PRESSURE: 64 MMHG | OXYGEN SATURATION: 95 % | SYSTOLIC BLOOD PRESSURE: 117 MMHG | RESPIRATION RATE: 16 BRPM | HEART RATE: 75 BPM

## 2022-11-16 VITALS
HEART RATE: 76 BPM | DIASTOLIC BLOOD PRESSURE: 67 MMHG | TEMPERATURE: 97.88 F | RESPIRATION RATE: 16 BRPM | SYSTOLIC BLOOD PRESSURE: 110 MMHG | OXYGEN SATURATION: 97 %

## 2022-11-16 VITALS
OXYGEN SATURATION: 95 % | DIASTOLIC BLOOD PRESSURE: 59 MMHG | RESPIRATION RATE: 16 BRPM | TEMPERATURE: 97.4 F | HEART RATE: 80 BPM | SYSTOLIC BLOOD PRESSURE: 91 MMHG

## 2022-11-16 VITALS
RESPIRATION RATE: 18 BRPM | TEMPERATURE: 97.52 F | SYSTOLIC BLOOD PRESSURE: 108 MMHG | DIASTOLIC BLOOD PRESSURE: 63 MMHG | HEART RATE: 75 BPM | OXYGEN SATURATION: 96 %

## 2022-11-16 VITALS
RESPIRATION RATE: 20 BRPM | TEMPERATURE: 98 F | OXYGEN SATURATION: 99 % | DIASTOLIC BLOOD PRESSURE: 54 MMHG | SYSTOLIC BLOOD PRESSURE: 107 MMHG | HEART RATE: 64 BPM

## 2022-11-16 VITALS
SYSTOLIC BLOOD PRESSURE: 107 MMHG | OXYGEN SATURATION: 96 % | HEART RATE: 72 BPM | DIASTOLIC BLOOD PRESSURE: 60 MMHG | RESPIRATION RATE: 16 BRPM

## 2022-11-16 VITALS
TEMPERATURE: 98.8 F | SYSTOLIC BLOOD PRESSURE: 107 MMHG | RESPIRATION RATE: 20 BRPM | HEART RATE: 88 BPM | OXYGEN SATURATION: 98 % | DIASTOLIC BLOOD PRESSURE: 58 MMHG

## 2022-11-16 VITALS
DIASTOLIC BLOOD PRESSURE: 58 MMHG | SYSTOLIC BLOOD PRESSURE: 117 MMHG | OXYGEN SATURATION: 97 % | TEMPERATURE: 97.9 F | HEART RATE: 80 BPM | RESPIRATION RATE: 16 BRPM

## 2022-11-16 VITALS — TEMPERATURE: 98.42 F

## 2022-11-16 VITALS
TEMPERATURE: 97.9 F | RESPIRATION RATE: 20 BRPM | HEART RATE: 77 BPM | DIASTOLIC BLOOD PRESSURE: 61 MMHG | OXYGEN SATURATION: 99 % | SYSTOLIC BLOOD PRESSURE: 113 MMHG

## 2022-11-16 VITALS
TEMPERATURE: 97.52 F | OXYGEN SATURATION: 95 % | DIASTOLIC BLOOD PRESSURE: 58 MMHG | SYSTOLIC BLOOD PRESSURE: 117 MMHG | RESPIRATION RATE: 18 BRPM | HEART RATE: 71 BPM

## 2022-11-16 LAB
ALBUMIN LEVEL: 2.4 G/DL (ref 3.5–5)
ALBUMIN/GLOB SERPL: 1 {RATIO} (ref 1.1–1.8)
ALP ISO SERPL-ACNC: 199 U/L (ref 38–126)
ALT SERPLBLD-CCNC: 36 U/L (ref 12–78)
ANION GAP SERPL CALC-SCNC: 12.7 MEQ/L (ref 5–15)
ANISOCYTOSIS BLD QL: (no result)
APPEARANCE,URINE/CATH: CLEAR
AST SERPL QL: 32 U/L (ref 14–36)
BACTERIA,URINE/CATH: (no result) /LPF
BILIRUBIN,TOTAL: 2.7 MG/DL (ref 0.2–1.3)
BUN SERPL-MCNC: 16 MG/DL (ref 7–17)
CALCIUM SPEC-MCNC: 7.8 MG/DL (ref 8.4–10.2)
CELLS COUNTED: 100
CHLORIDE SPEC-SCNC: 98 MMOL/L (ref 98–107)
CO2 SERPL-SCNC: 26 MMOL/L (ref 22–30)
COLOR,URINE/CATH: YELLOW
CREAT BLD-SCNC: 0.9 MG/DL (ref 0.52–1.04)
CREATININE CLEARANCE ESTIMATED: 61 ML/MIN (ref 50–200)
ESTIMATED GLOMERULAR FILT RATE: 60 ML/MIN (ref 60–?)
GFR (AFRICAN AMERICAN): 73 ML/MIN (ref 60–?)
GLOBULIN SER CALC-MCNC: 2.4 G/DL (ref 1.3–3.2)
GLUCOSE: 106 MG/DL (ref 74–100)
HCT VFR BLD CALC: 30.9 % (ref 37–47)
HGB BLD-MCNC: 10.1 G/DL (ref 12.2–16.2)
MACROCYTES BLD QL: (no result)
MAGNESIUM: 1.4 MG/DL (ref 1.6–2.3)
MANUAL DIFFERENTIAL: (no result)
MCHC RBC-ENTMCNC: 32.6 G/DL (ref 31.8–35.4)
MCV RBC: 105.6 FL (ref 81–99)
MEAN CORPUSCULAR HEMOGLOBIN: 34.4 PG (ref 27–31.2)
MICROSCOPIC,CATH: (no result)
OVALOCYTES BLD QL SMEAR: (no result)
PH,URINE/CATH: 6 (ref 5–8.5)
PLATELET # BLD: 127 K/MM3 (ref 142–424)
POTASSIUM: 2.7 MMOL/L (ref 3.5–5.1)
PROT SERPL-MCNC: 4.8 G/DL (ref 6.3–8.2)
RBC # BLD AUTO: 2.93 M/MM3 (ref 4.2–5.4)
SODIUM SPEC-SCNC: 134 MMOL/L (ref 136–145)
SPECIFIC GRAVITY, URINE/CATH: 1.01 (ref 1–1.03)
UROBILINOGEN,CATH: 0.2 EU/DL
WBC # BLD AUTO: 13.6 K/MM3 (ref 4.8–10.8)
WBC,URINE/CATH: (no result) #/HPF (ref 0–3)

## 2022-11-16 PROCEDURE — 0J9M30Z DRAINAGE OF LEFT UPPER LEG SUBCUTANEOUS TISSUE AND FASCIA WITH DRAINAGE DEVICE, PERCUTANEOUS APPROACH: ICD-10-PCS | Performed by: ORTHOPAEDIC SURGERY

## 2022-11-16 NOTE — PC.NURSE
PT IS OFF THE FLOOR AT THIS TIME FOR SURGERY. ALERT TO SELF ONLY. TURNED AND REPOSITIONED IN BED. PT HAS BEEN NPO T/O THE SHIFT. PT HAS 3 + PITTING EDEMA NOTED TO BLE. REDNESS AND SMALL STAGE 2 NOTED TO THE COCCYX. DRESSING TO THE LEFT HIP CHANGED

## 2022-11-17 VITALS
TEMPERATURE: 97.4 F | OXYGEN SATURATION: 93 % | SYSTOLIC BLOOD PRESSURE: 128 MMHG | RESPIRATION RATE: 18 BRPM | DIASTOLIC BLOOD PRESSURE: 76 MMHG | HEART RATE: 90 BPM

## 2022-11-17 VITALS
HEART RATE: 80 BPM | DIASTOLIC BLOOD PRESSURE: 68 MMHG | RESPIRATION RATE: 16 BRPM | TEMPERATURE: 97.7 F | OXYGEN SATURATION: 97 % | SYSTOLIC BLOOD PRESSURE: 113 MMHG

## 2022-11-17 VITALS
SYSTOLIC BLOOD PRESSURE: 112 MMHG | TEMPERATURE: 98.6 F | RESPIRATION RATE: 18 BRPM | OXYGEN SATURATION: 94 % | HEART RATE: 73 BPM | DIASTOLIC BLOOD PRESSURE: 61 MMHG

## 2022-11-17 VITALS
OXYGEN SATURATION: 98 % | HEART RATE: 72 BPM | DIASTOLIC BLOOD PRESSURE: 73 MMHG | SYSTOLIC BLOOD PRESSURE: 123 MMHG | TEMPERATURE: 97.7 F | RESPIRATION RATE: 16 BRPM

## 2022-11-17 VITALS
HEART RATE: 70 BPM | OXYGEN SATURATION: 100 % | RESPIRATION RATE: 17 BRPM | SYSTOLIC BLOOD PRESSURE: 119 MMHG | TEMPERATURE: 97.6 F | DIASTOLIC BLOOD PRESSURE: 65 MMHG

## 2022-11-17 VITALS
TEMPERATURE: 97.9 F | OXYGEN SATURATION: 97 % | SYSTOLIC BLOOD PRESSURE: 149 MMHG | RESPIRATION RATE: 18 BRPM | DIASTOLIC BLOOD PRESSURE: 90 MMHG | HEART RATE: 90 BPM

## 2022-11-17 VITALS — OXYGEN SATURATION: 100 %

## 2022-11-17 VITALS
HEART RATE: 71 BPM | RESPIRATION RATE: 18 BRPM | DIASTOLIC BLOOD PRESSURE: 54 MMHG | TEMPERATURE: 97.9 F | SYSTOLIC BLOOD PRESSURE: 100 MMHG | OXYGEN SATURATION: 97 %

## 2022-11-17 LAB
ALBUMIN LEVEL: 2.5 G/DL (ref 3.5–5)
ALBUMIN/GLOB SERPL: 1 {RATIO} (ref 1.1–1.8)
ALP ISO SERPL-ACNC: 215 U/L (ref 38–126)
ALT SERPLBLD-CCNC: 33 U/L (ref 12–78)
ANION GAP SERPL CALC-SCNC: 13.1 MEQ/L (ref 5–15)
ANISOCYTOSIS BLD QL: (no result)
AST SERPL QL: 32 U/L (ref 14–36)
BILIRUBIN,TOTAL: 1.9 MG/DL (ref 0.2–1.3)
BUN SERPL-MCNC: 19 MG/DL (ref 7–17)
CALCIUM SPEC-MCNC: 8.4 MG/DL (ref 8.4–10.2)
CELLS COUNTED: 100
CHLORIDE SPEC-SCNC: 100 MMOL/L (ref 98–107)
CO2 SERPL-SCNC: 25 MMOL/L (ref 22–30)
CREAT BLD-SCNC: 0.8 MG/DL (ref 0.52–1.04)
CREATININE CLEARANCE ESTIMATED: 60 ML/MIN (ref 50–200)
CRP SERPL HS-MCNC: 94.8 MG/L (ref 0–4)
ESTIMATED GLOMERULAR FILT RATE: 69 ML/MIN (ref 60–?)
GFR (AFRICAN AMERICAN): 83 ML/MIN (ref 60–?)
GLOBULIN SER CALC-MCNC: 2.6 G/DL (ref 1.3–3.2)
GLUCOSE: 75 MG/DL (ref 74–100)
HCT VFR BLD CALC: 33.8 % (ref 37–47)
HGB BLD-MCNC: 10.9 G/DL (ref 12.2–16.2)
MACROCYTES BLD QL: (no result)
MAGNESIUM: 2 MG/DL (ref 1.6–2.3)
MANUAL DIFFERENTIAL: (no result)
MCHC RBC-ENTMCNC: 32.3 G/DL (ref 31.8–35.4)
MCV RBC: 107.6 FL (ref 81–99)
MEAN CORPUSCULAR HEMOGLOBIN: 34.7 PG (ref 27–31.2)
OVALOCYTES BLD QL SMEAR: (no result)
PLATELET # BLD: 139 K/MM3 (ref 142–424)
POTASSIUM: 3.1 MMOL/L (ref 3.5–5.1)
PROT SERPL-MCNC: 5.1 G/DL (ref 6.3–8.2)
RBC # BLD AUTO: 3.14 M/MM3 (ref 4.2–5.4)
SODIUM SPEC-SCNC: 135 MMOL/L (ref 136–145)
VANCOMYCIN PEAK SERPL-MCNC: 25.7 UG/ML (ref 11–39)
VANCOMYCIN TROUGH SERPL-MCNC: 12.6 UG/ML (ref 5–10)
WBC # BLD AUTO: 12.5 K/MM3 (ref 4.8–10.8)

## 2022-11-17 NOTE — PC.NURSE
She is alert to person.  Her daughter is in the room.  She continues in contact and airborne precautions.  She denies pain.  Her daughter reports that her last BM was today (11/17/22).  She is being turned and repositioned via assist x2.  F/c patent

## 2022-11-17 NOTE — PC.NURSE
PT IS RESTING IN BED. ALERT TO SELF ONLY. PT TOLERATED SITTING UP IN THE CHAIR FOR SEVERAL HOURS THIS SHIFT. PT IS 2 ASSIST TO GET OOB. PT HAS HAD SEVERAL LOOSE STOOLS THIS SHIFT. REDNESS/SMALL STAGE 2 NOTED TO THE COCCYX. REDNESS/EXCORIATION NOTED

## 2022-11-17 NOTE — PC.NURSE
Contacted patient's daughter for consent to insert picc line/midline, verified w/ T LESTER Wilson via phone.

## 2022-11-18 VITALS
TEMPERATURE: 98.8 F | SYSTOLIC BLOOD PRESSURE: 104 MMHG | RESPIRATION RATE: 16 BRPM | HEART RATE: 99 BPM | OXYGEN SATURATION: 93 % | DIASTOLIC BLOOD PRESSURE: 60 MMHG

## 2022-11-18 VITALS
DIASTOLIC BLOOD PRESSURE: 49 MMHG | SYSTOLIC BLOOD PRESSURE: 92 MMHG | HEART RATE: 67 BPM | OXYGEN SATURATION: 96 % | RESPIRATION RATE: 18 BRPM | TEMPERATURE: 97.52 F

## 2022-11-18 VITALS
HEART RATE: 77 BPM | TEMPERATURE: 97.7 F | OXYGEN SATURATION: 94 % | RESPIRATION RATE: 19 BRPM | SYSTOLIC BLOOD PRESSURE: 94 MMHG | DIASTOLIC BLOOD PRESSURE: 53 MMHG

## 2022-11-18 VITALS
HEART RATE: 73 BPM | RESPIRATION RATE: 20 BRPM | OXYGEN SATURATION: 97 % | DIASTOLIC BLOOD PRESSURE: 72 MMHG | SYSTOLIC BLOOD PRESSURE: 112 MMHG | TEMPERATURE: 97.52 F

## 2022-11-18 VITALS
HEART RATE: 73 BPM | RESPIRATION RATE: 17 BRPM | SYSTOLIC BLOOD PRESSURE: 107 MMHG | DIASTOLIC BLOOD PRESSURE: 47 MMHG | OXYGEN SATURATION: 93 %

## 2022-11-18 VITALS
DIASTOLIC BLOOD PRESSURE: 53 MMHG | HEART RATE: 63 BPM | SYSTOLIC BLOOD PRESSURE: 92 MMHG | RESPIRATION RATE: 16 BRPM | OXYGEN SATURATION: 95 %

## 2022-11-18 VITALS
OXYGEN SATURATION: 96 % | RESPIRATION RATE: 21 BRPM | HEART RATE: 70 BPM | TEMPERATURE: 97.7 F | SYSTOLIC BLOOD PRESSURE: 100 MMHG | DIASTOLIC BLOOD PRESSURE: 42 MMHG

## 2022-11-18 VITALS
HEART RATE: 63 BPM | DIASTOLIC BLOOD PRESSURE: 52 MMHG | SYSTOLIC BLOOD PRESSURE: 90 MMHG | RESPIRATION RATE: 18 BRPM | TEMPERATURE: 97.5 F | OXYGEN SATURATION: 93 %

## 2022-11-18 VITALS
DIASTOLIC BLOOD PRESSURE: 52 MMHG | SYSTOLIC BLOOD PRESSURE: 95 MMHG | RESPIRATION RATE: 16 BRPM | TEMPERATURE: 97.16 F | HEART RATE: 79 BPM | OXYGEN SATURATION: 95 %

## 2022-11-18 VITALS
DIASTOLIC BLOOD PRESSURE: 57 MMHG | OXYGEN SATURATION: 94 % | SYSTOLIC BLOOD PRESSURE: 91 MMHG | RESPIRATION RATE: 16 BRPM | HEART RATE: 71 BPM

## 2022-11-18 VITALS
OXYGEN SATURATION: 95 % | SYSTOLIC BLOOD PRESSURE: 131 MMHG | RESPIRATION RATE: 16 BRPM | HEART RATE: 67 BPM | DIASTOLIC BLOOD PRESSURE: 72 MMHG

## 2022-11-18 VITALS
DIASTOLIC BLOOD PRESSURE: 58 MMHG | SYSTOLIC BLOOD PRESSURE: 91 MMHG | HEART RATE: 79 BPM | OXYGEN SATURATION: 95 % | RESPIRATION RATE: 17 BRPM

## 2022-11-18 VITALS
OXYGEN SATURATION: 95 % | TEMPERATURE: 97.7 F | DIASTOLIC BLOOD PRESSURE: 55 MMHG | SYSTOLIC BLOOD PRESSURE: 101 MMHG | RESPIRATION RATE: 19 BRPM | HEART RATE: 68 BPM

## 2022-11-18 VITALS
RESPIRATION RATE: 18 BRPM | DIASTOLIC BLOOD PRESSURE: 51 MMHG | TEMPERATURE: 97.88 F | HEART RATE: 63 BPM | OXYGEN SATURATION: 95 % | SYSTOLIC BLOOD PRESSURE: 105 MMHG

## 2022-11-18 VITALS
HEART RATE: 72 BPM | RESPIRATION RATE: 16 BRPM | DIASTOLIC BLOOD PRESSURE: 49 MMHG | SYSTOLIC BLOOD PRESSURE: 103 MMHG | TEMPERATURE: 97.1 F | OXYGEN SATURATION: 94 %

## 2022-11-18 VITALS
RESPIRATION RATE: 18 BRPM | HEART RATE: 69 BPM | SYSTOLIC BLOOD PRESSURE: 93 MMHG | DIASTOLIC BLOOD PRESSURE: 55 MMHG | TEMPERATURE: 97.5 F | OXYGEN SATURATION: 94 %

## 2022-11-18 VITALS
DIASTOLIC BLOOD PRESSURE: 59 MMHG | HEART RATE: 82 BPM | TEMPERATURE: 97.88 F | OXYGEN SATURATION: 98 % | SYSTOLIC BLOOD PRESSURE: 105 MMHG | RESPIRATION RATE: 18 BRPM

## 2022-11-18 VITALS
OXYGEN SATURATION: 94 % | HEART RATE: 67 BPM | SYSTOLIC BLOOD PRESSURE: 100 MMHG | TEMPERATURE: 97.52 F | RESPIRATION RATE: 20 BRPM | DIASTOLIC BLOOD PRESSURE: 66 MMHG

## 2022-11-18 VITALS
OXYGEN SATURATION: 96 % | DIASTOLIC BLOOD PRESSURE: 47 MMHG | RESPIRATION RATE: 21 BRPM | SYSTOLIC BLOOD PRESSURE: 114 MMHG | TEMPERATURE: 97.88 F | HEART RATE: 76 BPM

## 2022-11-18 VITALS
HEART RATE: 67 BPM | SYSTOLIC BLOOD PRESSURE: 102 MMHG | OXYGEN SATURATION: 95 % | DIASTOLIC BLOOD PRESSURE: 56 MMHG | RESPIRATION RATE: 16 BRPM

## 2022-11-18 VITALS
RESPIRATION RATE: 20 BRPM | HEART RATE: 88 BPM | OXYGEN SATURATION: 96 % | DIASTOLIC BLOOD PRESSURE: 67 MMHG | SYSTOLIC BLOOD PRESSURE: 105 MMHG | TEMPERATURE: 97.7 F

## 2022-11-18 LAB
ALBUMIN LEVEL: 2.4 G/DL (ref 3.5–5)
ALBUMIN/GLOB SERPL: 0.9 {RATIO} (ref 1.1–1.8)
ALP ISO SERPL-ACNC: 193 U/L (ref 38–126)
ALT SERPLBLD-CCNC: 32 U/L (ref 12–78)
ANION GAP SERPL CALC-SCNC: 10.9 MEQ/L (ref 5–15)
AST SERPL QL: 31 U/L (ref 14–36)
BILIRUBIN,TOTAL: 2 MG/DL (ref 0.2–1.3)
BUN SERPL-MCNC: 21 MG/DL (ref 7–17)
CALCIUM SPEC-MCNC: 8.3 MG/DL (ref 8.4–10.2)
CHLORIDE SPEC-SCNC: 101 MMOL/L (ref 98–107)
CO2 SERPL-SCNC: 25 MMOL/L (ref 22–30)
CREAT BLD-SCNC: 0.9 MG/DL (ref 0.52–1.04)
CREATININE CLEARANCE ESTIMATED: 60 ML/MIN (ref 50–200)
ESTIMATED GLOMERULAR FILT RATE: 60 ML/MIN (ref 60–?)
GFR (AFRICAN AMERICAN): 73 ML/MIN (ref 60–?)
GLOBULIN SER CALC-MCNC: 2.6 G/DL (ref 1.3–3.2)
GLUCOSE: 93 MG/DL (ref 74–100)
HCT VFR BLD CALC: 33.7 % (ref 37–47)
HGB BLD-MCNC: 11 G/DL (ref 12.2–16.2)
MAGNESIUM: 1.9 MG/DL (ref 1.6–2.3)
MCHC RBC-ENTMCNC: 32.6 G/DL (ref 31.8–35.4)
MCV RBC: 105.2 FL (ref 81–99)
MEAN CORPUSCULAR HEMOGLOBIN: 34.3 PG (ref 27–31.2)
PLATELET # BLD: 152 K/MM3 (ref 142–424)
POTASSIUM: 2.9 MMOL/L (ref 3.5–5.1)
PROT SERPL-MCNC: 5 G/DL (ref 6.3–8.2)
RBC # BLD AUTO: 3.2 M/MM3 (ref 4.2–5.4)
SODIUM SPEC-SCNC: 134 MMOL/L (ref 136–145)
WBC # BLD AUTO: 11.1 K/MM3 (ref 4.8–10.8)

## 2022-11-18 PROCEDURE — 02HV33Z INSERTION OF INFUSION DEVICE INTO SUPERIOR VENA CAVA, PERCUTANEOUS APPROACH: ICD-10-PCS | Performed by: ORTHOPAEDIC SURGERY

## 2022-11-18 NOTE — SUR.PHASEI
system will not allow me to chart another area to right upper arm-PICC IS in place in Right upper arm.

## 2022-11-19 VITALS
RESPIRATION RATE: 20 BRPM | SYSTOLIC BLOOD PRESSURE: 117 MMHG | HEART RATE: 75 BPM | TEMPERATURE: 98.24 F | OXYGEN SATURATION: 95 % | DIASTOLIC BLOOD PRESSURE: 75 MMHG

## 2022-11-19 VITALS
HEART RATE: 83 BPM | RESPIRATION RATE: 18 BRPM | DIASTOLIC BLOOD PRESSURE: 57 MMHG | SYSTOLIC BLOOD PRESSURE: 99 MMHG | TEMPERATURE: 98.06 F | OXYGEN SATURATION: 94 %

## 2022-11-19 VITALS
DIASTOLIC BLOOD PRESSURE: 63 MMHG | TEMPERATURE: 97.7 F | RESPIRATION RATE: 20 BRPM | SYSTOLIC BLOOD PRESSURE: 121 MMHG | OXYGEN SATURATION: 95 % | HEART RATE: 78 BPM

## 2022-11-19 VITALS
RESPIRATION RATE: 16 BRPM | HEART RATE: 66 BPM | SYSTOLIC BLOOD PRESSURE: 96 MMHG | OXYGEN SATURATION: 94 % | DIASTOLIC BLOOD PRESSURE: 58 MMHG | TEMPERATURE: 98.24 F

## 2022-11-19 VITALS
TEMPERATURE: 97.7 F | HEART RATE: 68 BPM | SYSTOLIC BLOOD PRESSURE: 96 MMHG | DIASTOLIC BLOOD PRESSURE: 66 MMHG | RESPIRATION RATE: 16 BRPM | OXYGEN SATURATION: 97 %

## 2022-11-19 VITALS
RESPIRATION RATE: 16 BRPM | HEART RATE: 60 BPM | TEMPERATURE: 97.52 F | DIASTOLIC BLOOD PRESSURE: 56 MMHG | OXYGEN SATURATION: 96 % | SYSTOLIC BLOOD PRESSURE: 110 MMHG

## 2022-11-19 VITALS
SYSTOLIC BLOOD PRESSURE: 126 MMHG | RESPIRATION RATE: 20 BRPM | DIASTOLIC BLOOD PRESSURE: 66 MMHG | HEART RATE: 71 BPM | OXYGEN SATURATION: 96 % | TEMPERATURE: 98.4 F

## 2022-11-19 LAB
ALBUMIN LEVEL: 2.1 G/DL (ref 3.5–5)
ALBUMIN/GLOB SERPL: 0.9 {RATIO} (ref 1.1–1.8)
ALP ISO SERPL-ACNC: 172 U/L (ref 38–126)
ALT SERPLBLD-CCNC: 28 U/L (ref 12–78)
ANION GAP SERPL CALC-SCNC: 12 MEQ/L (ref 5–15)
AST SERPL QL: 30 U/L (ref 14–36)
BILIRUBIN,TOTAL: 1.4 MG/DL (ref 0.2–1.3)
BUN SERPL-MCNC: 20 MG/DL (ref 7–17)
CALCIUM SPEC-MCNC: 8.1 MG/DL (ref 8.4–10.2)
CHLORIDE SPEC-SCNC: 100 MMOL/L (ref 98–107)
CO2 SERPL-SCNC: 25 MMOL/L (ref 22–30)
CREAT BLD-SCNC: 0.9 MG/DL (ref 0.52–1.04)
CREATININE CLEARANCE ESTIMATED: 62 ML/MIN (ref 50–200)
ESTIMATED GLOMERULAR FILT RATE: 60 ML/MIN (ref 60–?)
GFR (AFRICAN AMERICAN): 73 ML/MIN (ref 60–?)
GLOBULIN SER CALC-MCNC: 2.4 G/DL (ref 1.3–3.2)
GLUCOSE: 80 MG/DL (ref 74–100)
HCT VFR BLD CALC: 29.4 % (ref 37–47)
HGB BLD-MCNC: 10 G/DL (ref 12.2–16.2)
MAGNESIUM: 1.8 MG/DL (ref 1.6–2.3)
MCHC RBC-ENTMCNC: 34 G/DL (ref 31.8–35.4)
MCV RBC: 102.5 FL (ref 81–99)
MEAN CORPUSCULAR HEMOGLOBIN: 34.9 PG (ref 27–31.2)
PLATELET # BLD: 132 K/MM3 (ref 142–424)
POTASSIUM: 3 MMOL/L (ref 3.5–5.1)
PROT SERPL-MCNC: 4.5 G/DL (ref 6.3–8.2)
RBC # BLD AUTO: 2.87 M/MM3 (ref 4.2–5.4)
SODIUM SPEC-SCNC: 134 MMOL/L (ref 136–145)
WBC # BLD AUTO: 7.7 K/MM3 (ref 4.8–10.8)

## 2022-11-19 NOTE — PC.NURSE
pt has been confused this shift. wound vac in place to hip with sanguineous drainage noted in wound vac container. she has tolerated room air well with no drop in spo2 or s/s of distress noted.

## 2022-11-19 NOTE — PC.NURSE
2239 Brenda INTERIANO NP called with blood culture results, brenda stated she will look at the results herself, note new orders entered for blood cultures to be redrawn.

## 2022-11-20 VITALS
RESPIRATION RATE: 20 BRPM | HEART RATE: 80 BPM | TEMPERATURE: 97.52 F | SYSTOLIC BLOOD PRESSURE: 119 MMHG | OXYGEN SATURATION: 97 % | DIASTOLIC BLOOD PRESSURE: 76 MMHG

## 2022-11-20 VITALS — HEART RATE: 74 BPM

## 2022-11-20 VITALS
RESPIRATION RATE: 16 BRPM | OXYGEN SATURATION: 97 % | SYSTOLIC BLOOD PRESSURE: 111 MMHG | DIASTOLIC BLOOD PRESSURE: 65 MMHG | TEMPERATURE: 97.1 F | HEART RATE: 75 BPM

## 2022-11-20 VITALS
OXYGEN SATURATION: 95 % | HEART RATE: 63 BPM | SYSTOLIC BLOOD PRESSURE: 97 MMHG | TEMPERATURE: 97.52 F | DIASTOLIC BLOOD PRESSURE: 58 MMHG | RESPIRATION RATE: 18 BRPM

## 2022-11-20 VITALS
SYSTOLIC BLOOD PRESSURE: 109 MMHG | HEART RATE: 60 BPM | DIASTOLIC BLOOD PRESSURE: 63 MMHG | RESPIRATION RATE: 18 BRPM | OXYGEN SATURATION: 97 % | TEMPERATURE: 97.9 F

## 2022-11-20 VITALS
RESPIRATION RATE: 20 BRPM | HEART RATE: 70 BPM | DIASTOLIC BLOOD PRESSURE: 56 MMHG | OXYGEN SATURATION: 96 % | TEMPERATURE: 97.88 F | SYSTOLIC BLOOD PRESSURE: 93 MMHG

## 2022-11-20 VITALS — OXYGEN SATURATION: 96 % | HEART RATE: 70 BPM

## 2022-11-20 LAB
ANION GAP SERPL CALC-SCNC: 8.9 MEQ/L (ref 5–15)
BUN SERPL-MCNC: 22 MG/DL (ref 7–17)
CALCIUM SPEC-MCNC: 8.2 MG/DL (ref 8.4–10.2)
CHLORIDE SPEC-SCNC: 100 MMOL/L (ref 98–107)
CO2 SERPL-SCNC: 27 MMOL/L (ref 22–30)
CREAT BLD-SCNC: 1 MG/DL (ref 0.52–1.04)
CREATININE CLEARANCE ESTIMATED: 61 ML/MIN (ref 50–200)
CRP SERPL HS-MCNC: 36.6 MG/L (ref 0–4)
ESTIMATED GLOMERULAR FILT RATE: 53 ML/MIN (ref 60–?)
GFR (AFRICAN AMERICAN): 64 ML/MIN (ref 60–?)
GLUCOSE: 84 MG/DL (ref 74–100)
HCT VFR BLD CALC: 33.1 % (ref 37–47)
HGB BLD-MCNC: 10.9 G/DL (ref 12.2–16.2)
MAGNESIUM: 1.7 MG/DL (ref 1.6–2.3)
MCHC RBC-ENTMCNC: 33 G/DL (ref 31.8–35.4)
MCV RBC: 103.4 FL (ref 81–99)
MEAN CORPUSCULAR HEMOGLOBIN: 34.1 PG (ref 27–31.2)
PLATELET # BLD: 134 K/MM3 (ref 142–424)
POTASSIUM: 2.9 MMOL/L (ref 3.5–5.1)
RBC # BLD AUTO: 3.2 M/MM3 (ref 4.2–5.4)
SODIUM SPEC-SCNC: 133 MMOL/L (ref 136–145)
VANCOMYCIN TROUGH SERPL-MCNC: 19.6 UG/ML (ref 5–10)
WBC # BLD AUTO: 6.9 K/MM3 (ref 4.8–10.8)

## 2022-11-20 NOTE — PC.NURSE
PT WAS VERY SOMNOLENT THIS MORNING, MINIMALLY RESPONSIVE TO STERNAL RUB, WOULD NOT ACKNOWLEDGE THIS RN OR FOLLOW BASIC COMMANDS. SHE HAS SLIGHTLY IMPROVED IN THIS REGARD THIS AFTERNOON, WAS ABLE TO TAKE MOST PO MEDICATIONS AND DID OPEN HER EYES. SHE

## 2022-11-20 NOTE — PC.NURSE
0900-PT IS DIFFICULT TO AROUSE THIS MORNING. UNABLE TO TAKE PO MEDICATIONS AT THIS TIME. DR JUAN WAS MADE AWARE VIA TELEPHONE.

## 2022-11-21 VITALS
SYSTOLIC BLOOD PRESSURE: 154 MMHG | TEMPERATURE: 98.06 F | HEART RATE: 105 BPM | OXYGEN SATURATION: 94 % | RESPIRATION RATE: 16 BRPM | DIASTOLIC BLOOD PRESSURE: 86 MMHG

## 2022-11-21 VITALS
TEMPERATURE: 97.8 F | RESPIRATION RATE: 18 BRPM | OXYGEN SATURATION: 95 % | DIASTOLIC BLOOD PRESSURE: 68 MMHG | HEART RATE: 73 BPM | SYSTOLIC BLOOD PRESSURE: 109 MMHG

## 2022-11-21 VITALS
HEART RATE: 70 BPM | TEMPERATURE: 97.7 F | RESPIRATION RATE: 16 BRPM | SYSTOLIC BLOOD PRESSURE: 99 MMHG | OXYGEN SATURATION: 95 % | DIASTOLIC BLOOD PRESSURE: 60 MMHG

## 2022-11-21 VITALS
DIASTOLIC BLOOD PRESSURE: 71 MMHG | TEMPERATURE: 97.16 F | RESPIRATION RATE: 16 BRPM | HEART RATE: 88 BPM | OXYGEN SATURATION: 98 % | SYSTOLIC BLOOD PRESSURE: 119 MMHG

## 2022-11-21 VITALS — HEART RATE: 76 BPM

## 2022-11-21 VITALS
HEART RATE: 72 BPM | TEMPERATURE: 98.1 F | SYSTOLIC BLOOD PRESSURE: 111 MMHG | DIASTOLIC BLOOD PRESSURE: 50 MMHG | RESPIRATION RATE: 20 BRPM | OXYGEN SATURATION: 96 %

## 2022-11-21 VITALS — OXYGEN SATURATION: 94 % | RESPIRATION RATE: 18 BRPM | TEMPERATURE: 98.7 F

## 2022-11-21 LAB
ALBUMIN LEVEL: 2.2 G/DL (ref 3.5–5)
ALBUMIN/GLOB SERPL: 0.8 {RATIO} (ref 1.1–1.8)
ALP ISO SERPL-ACNC: 177 U/L (ref 38–126)
ALT SERPLBLD-CCNC: 29 U/L (ref 12–78)
ANION GAP SERPL CALC-SCNC: 8.2 MEQ/L (ref 5–15)
AST SERPL QL: 37 U/L (ref 14–36)
BILIRUBIN,TOTAL: 1.5 MG/DL (ref 0.2–1.3)
BUN SERPL-MCNC: 23 MG/DL (ref 7–17)
CALCIUM SPEC-MCNC: 8.2 MG/DL (ref 8.4–10.2)
CHLORIDE SPEC-SCNC: 100 MMOL/L (ref 98–107)
CO2 SERPL-SCNC: 27 MMOL/L (ref 22–30)
CREAT BLD-SCNC: 1 MG/DL (ref 0.52–1.04)
CREATININE CLEARANCE ESTIMATED: 62 ML/MIN (ref 50–200)
ESTIMATED GLOMERULAR FILT RATE: 53 ML/MIN (ref 60–?)
GFR (AFRICAN AMERICAN): 64 ML/MIN (ref 60–?)
GLOBULIN SER CALC-MCNC: 2.7 G/DL (ref 1.3–3.2)
GLUCOSE: 85 MG/DL (ref 74–100)
HCT VFR BLD CALC: 33.7 % (ref 37–47)
HGB BLD-MCNC: 11.2 G/DL (ref 12.2–16.2)
MAGNESIUM: 1.6 MG/DL (ref 1.6–2.3)
MCHC RBC-ENTMCNC: 33.2 G/DL (ref 31.8–35.4)
MCV RBC: 103 FL (ref 81–99)
MEAN CORPUSCULAR HEMOGLOBIN: 34.2 PG (ref 27–31.2)
PLATELET # BLD: 155 K/MM3 (ref 142–424)
POTASSIUM: 3.2 MMOL/L (ref 3.5–5.1)
PROT SERPL-MCNC: 4.9 G/DL (ref 6.3–8.2)
RBC # BLD AUTO: 3.27 M/MM3 (ref 4.2–5.4)
SODIUM SPEC-SCNC: 132 MMOL/L (ref 136–145)
TROPONIN I: < 0.01 NG/ML (ref 0–0.03)
TROPONIN I: < 0.01 NG/ML (ref 0–0.03)
VANCOMYCIN TROUGH SERPL-MCNC: 20.7 UG/ML (ref 5–10)
WBC # BLD AUTO: 9.1 K/MM3 (ref 4.8–10.8)

## 2022-11-21 NOTE — PC.NURSE
pt rested well through the night, pt more alert after seroquel discontinued by md, f/c to bsd with cyu noted, pt is alert to name and bd only, pt is confused to place and time, wound vac in place to left hip, skin pwd wrinkled and fragile, pt on

## 2022-11-22 VITALS
DIASTOLIC BLOOD PRESSURE: 64 MMHG | RESPIRATION RATE: 17 BRPM | SYSTOLIC BLOOD PRESSURE: 120 MMHG | HEART RATE: 62 BPM | OXYGEN SATURATION: 94 % | TEMPERATURE: 98.1 F

## 2022-11-22 VITALS — RESPIRATION RATE: 20 BRPM | TEMPERATURE: 97.8 F | OXYGEN SATURATION: 95 %

## 2022-11-22 VITALS
DIASTOLIC BLOOD PRESSURE: 73 MMHG | RESPIRATION RATE: 16 BRPM | SYSTOLIC BLOOD PRESSURE: 111 MMHG | TEMPERATURE: 98.7 F | HEART RATE: 77 BPM | OXYGEN SATURATION: 95 %

## 2022-11-22 VITALS
TEMPERATURE: 98.4 F | HEART RATE: 82 BPM | DIASTOLIC BLOOD PRESSURE: 65 MMHG | OXYGEN SATURATION: 95 % | SYSTOLIC BLOOD PRESSURE: 108 MMHG | RESPIRATION RATE: 16 BRPM

## 2022-11-22 VITALS
SYSTOLIC BLOOD PRESSURE: 125 MMHG | HEART RATE: 84 BPM | TEMPERATURE: 98.06 F | OXYGEN SATURATION: 95 % | RESPIRATION RATE: 17 BRPM | DIASTOLIC BLOOD PRESSURE: 74 MMHG

## 2022-11-22 NOTE — PC.NURSE
pt awake most of night, alert and oriented to name only, pt with purewick in place, has used it once through the night, skin pwd, wound vac in place, no other issues or concerns at this time, telemetry was discontinued at start of shift per verbal

## 2022-11-23 VITALS
OXYGEN SATURATION: 98 % | HEART RATE: 72 BPM | RESPIRATION RATE: 17 BRPM | SYSTOLIC BLOOD PRESSURE: 105 MMHG | DIASTOLIC BLOOD PRESSURE: 59 MMHG | TEMPERATURE: 97.52 F

## 2022-11-23 VITALS
SYSTOLIC BLOOD PRESSURE: 100 MMHG | DIASTOLIC BLOOD PRESSURE: 59 MMHG | OXYGEN SATURATION: 96 % | TEMPERATURE: 97.6 F | HEART RATE: 73 BPM | RESPIRATION RATE: 16 BRPM

## 2022-11-23 VITALS
OXYGEN SATURATION: 98 % | HEART RATE: 76 BPM | SYSTOLIC BLOOD PRESSURE: 116 MMHG | TEMPERATURE: 98.78 F | RESPIRATION RATE: 16 BRPM | DIASTOLIC BLOOD PRESSURE: 69 MMHG

## 2022-11-23 VITALS
HEART RATE: 74 BPM | DIASTOLIC BLOOD PRESSURE: 68 MMHG | RESPIRATION RATE: 16 BRPM | OXYGEN SATURATION: 98 % | TEMPERATURE: 97.9 F | SYSTOLIC BLOOD PRESSURE: 110 MMHG

## 2022-11-23 VITALS — OXYGEN SATURATION: 96 %

## 2022-11-23 LAB
ALBUMIN LEVEL: 2.6 G/DL (ref 3.5–5)
ALBUMIN/GLOB SERPL: 1 {RATIO} (ref 1.1–1.8)
ALP ISO SERPL-ACNC: 201 U/L (ref 38–126)
ALT SERPLBLD-CCNC: 33 U/L (ref 12–78)
ANION GAP SERPL CALC-SCNC: 11.1 MEQ/L (ref 5–15)
AST SERPL QL: 40 U/L (ref 14–36)
BILIRUBIN,TOTAL: 2 MG/DL (ref 0.2–1.3)
BUN SERPL-MCNC: 24 MG/DL (ref 7–17)
CALCIUM SPEC-MCNC: 8.7 MG/DL (ref 8.4–10.2)
CHLORIDE SPEC-SCNC: 98 MMOL/L (ref 98–107)
CO2 SERPL-SCNC: 26 MMOL/L (ref 22–30)
CREAT BLD-SCNC: 1.2 MG/DL (ref 0.52–1.04)
CREATININE CLEARANCE ESTIMATED: 51 ML/MIN (ref 50–200)
ESTIMATED GLOMERULAR FILT RATE: 43 ML/MIN (ref 60–?)
GFR (AFRICAN AMERICAN): 52 ML/MIN (ref 60–?)
GLOBULIN SER CALC-MCNC: 2.6 G/DL (ref 1.3–3.2)
GLUCOSE: 89 MG/DL (ref 74–100)
HCT VFR BLD CALC: 35.9 % (ref 37–47)
HGB BLD-MCNC: 11.8 G/DL (ref 12.2–16.2)
MAGNESIUM: 1.6 MG/DL (ref 1.6–2.3)
MCHC RBC-ENTMCNC: 33 G/DL (ref 31.8–35.4)
MCV RBC: 105.7 FL (ref 81–99)
MEAN CORPUSCULAR HEMOGLOBIN: 34.9 PG (ref 27–31.2)
PHOSPHOROUS: 2.8 MG/DL (ref 2.5–4.5)
PLATELET # BLD: 151 K/MM3 (ref 142–424)
POTASSIUM: 3.1 MMOL/L (ref 3.5–5.1)
PROT SERPL-MCNC: 5.2 G/DL (ref 6.3–8.2)
RBC # BLD AUTO: 3.4 M/MM3 (ref 4.2–5.4)
SODIUM SPEC-SCNC: 132 MMOL/L (ref 136–145)
WBC # BLD AUTO: 9.3 K/MM3 (ref 4.8–10.8)

## 2022-11-23 NOTE — PC.NURSE
pt is alert and oriented to name and birthday only, pt is confused to time and place, pt requires x2 assist and noted very weak upon transfer from chair to bed, skin with stage 2 area to coccyx are and pressure dressing applied, 3+ edema to BLE,

## 2022-11-23 NOTE — PC.NURSE
NAHUM BACK/DAUGHTER NOTIFIED OF PATIENTS TRANSFER BACK TO [facility].  REPORT CALLED TO SYED MACEDO RN/[place] MAR[place]. TO RETURN VIA AMBULANCE.

## 2022-11-27 ENCOUNTER — HOSPITAL ENCOUNTER (OUTPATIENT)
Dept: HOSPITAL 22 - LAB.DROPOF | Age: 82
End: 2022-11-27
Payer: MEDICARE

## 2022-11-27 DIAGNOSIS — Z51.81: Primary | ICD-10-CM

## 2022-11-27 LAB — VANCOMYCIN TROUGH SERPL-MCNC: 23.3 UG/ML (ref 5–10)

## 2022-11-27 PROCEDURE — 80202 ASSAY OF VANCOMYCIN: CPT

## 2022-11-29 ENCOUNTER — HOSPITAL ENCOUNTER (OUTPATIENT)
Dept: HOSPITAL 22 - INF | Age: 82
Discharge: HOME | End: 2022-11-29
Payer: MEDICARE

## 2022-11-29 VITALS
RESPIRATION RATE: 20 BRPM | DIASTOLIC BLOOD PRESSURE: 60 MMHG | HEART RATE: 68 BPM | OXYGEN SATURATION: 95 % | SYSTOLIC BLOOD PRESSURE: 90 MMHG | TEMPERATURE: 98.42 F

## 2022-11-29 VITALS — BODY MASS INDEX: 35.9 KG/M2

## 2022-11-29 DIAGNOSIS — Z45.2: Primary | ICD-10-CM

## 2022-11-29 DIAGNOSIS — T81.49XA: ICD-10-CM

## 2022-11-29 PROCEDURE — 36569 INSJ PICC 5 YR+ W/O IMAGING: CPT

## 2022-11-29 PROCEDURE — C1751 CATH, INF, PER/CENT/MIDLINE: HCPCS

## 2022-11-29 PROCEDURE — 71045 X-RAY EXAM CHEST 1 VIEW: CPT

## 2022-11-29 NOTE — PC.NURSE
PT ARRIVED AT 1045 TO GET PICC LINE PLACED; PT VERY EMOTIONAL AND FEARFUL OF PICC; CALLED MD AND MD ORDERED HALDOL TO BE GIVEN PRIOR TO PLACING PICC; NO ISSUES NOTED

## 2022-11-30 ENCOUNTER — HOSPITAL ENCOUNTER (OUTPATIENT)
Dept: HOSPITAL 22 - LAB.DROPOF | Age: 82
End: 2022-11-30
Payer: MEDICARE

## 2022-11-30 DIAGNOSIS — Z51.81: Primary | ICD-10-CM

## 2022-11-30 LAB — VANCOMYCIN TROUGH SERPL-MCNC: 29.1 UG/ML (ref 5–10)

## 2022-11-30 PROCEDURE — 80202 ASSAY OF VANCOMYCIN: CPT

## 2022-12-01 ENCOUNTER — HOSPITAL ENCOUNTER (OUTPATIENT)
Age: 82
End: 2022-12-01
Payer: MEDICARE

## 2022-12-01 DIAGNOSIS — M25.552: Primary | ICD-10-CM

## 2022-12-01 LAB — VANCOMYCIN TROUGH SERPL-MCNC: 26.1 UG/ML (ref 5–10)

## 2022-12-01 PROCEDURE — 73502 X-RAY EXAM HIP UNI 2-3 VIEWS: CPT

## 2022-12-01 PROCEDURE — 80202 ASSAY OF VANCOMYCIN: CPT

## 2022-12-03 ENCOUNTER — HOSPITAL ENCOUNTER (OUTPATIENT)
Dept: HOSPITAL 22 - LAB | Age: 82
End: 2022-12-03
Payer: MEDICARE

## 2022-12-03 DIAGNOSIS — Z51.81: Primary | ICD-10-CM

## 2022-12-03 LAB — VANCOMYCIN TROUGH SERPL-MCNC: 22.1 UG/ML (ref 5–10)

## 2022-12-03 PROCEDURE — 80202 ASSAY OF VANCOMYCIN: CPT

## 2022-12-05 ENCOUNTER — HOSPITAL ENCOUNTER (OUTPATIENT)
Dept: HOSPITAL 22 - LAB.DROPOF | Age: 82
End: 2022-12-05
Payer: SELF-PAY

## 2022-12-05 DIAGNOSIS — M25.552: Primary | ICD-10-CM

## 2022-12-05 DIAGNOSIS — Z51.81: ICD-10-CM

## 2022-12-05 LAB — VANCOMYCIN TROUGH SERPL-MCNC: 19 UG/ML (ref 5–10)

## 2022-12-05 PROCEDURE — 80202 ASSAY OF VANCOMYCIN: CPT

## 2022-12-08 ENCOUNTER — HOSPITAL ENCOUNTER (OUTPATIENT)
Dept: HOSPITAL 22 - LAB.DROPOF | Age: 82
End: 2022-12-08
Payer: SELF-PAY

## 2022-12-08 DIAGNOSIS — S72.002D: Primary | ICD-10-CM

## 2022-12-08 DIAGNOSIS — Z51.81: ICD-10-CM

## 2022-12-08 DIAGNOSIS — T81.49XD: ICD-10-CM

## 2022-12-08 LAB — VANCOMYCIN TROUGH SERPL-MCNC: 16.8 UG/ML (ref 5–10)

## 2022-12-08 PROCEDURE — 80202 ASSAY OF VANCOMYCIN: CPT

## 2022-12-09 ENCOUNTER — HOSPITAL ENCOUNTER (OUTPATIENT)
Age: 82
End: 2022-12-09
Payer: MEDICARE

## 2022-12-09 DIAGNOSIS — S72.002A: Primary | ICD-10-CM

## 2022-12-09 PROCEDURE — 73502 X-RAY EXAM HIP UNI 2-3 VIEWS: CPT

## 2022-12-13 ENCOUNTER — HOSPITAL ENCOUNTER (OUTPATIENT)
Age: 82
End: 2022-12-13
Payer: MEDICARE

## 2022-12-13 DIAGNOSIS — N39.0: Primary | ICD-10-CM

## 2022-12-13 LAB
COLOR UR: YELLOW
LEUKOCYTE ESTERASE UR QL STRIP: (no result)
MICRO URNS: (no result)
PH UR: 5.5 [PH] (ref 5–8.5)
SP GR UR: 1.02 (ref 1–1.03)
SQUAMOUS URNS QL MICRO: (no result) #/HPF (ref 0–5)
UROBILINOGEN UR QL: 0.2 EU/DL
WBC # UR: (no result) #/HPF (ref 0–3)

## 2022-12-13 PROCEDURE — 87086 URINE CULTURE/COLONY COUNT: CPT

## 2022-12-13 PROCEDURE — 81001 URINALYSIS AUTO W/SCOPE: CPT

## 2022-12-14 ENCOUNTER — HOSPITAL ENCOUNTER (OUTPATIENT)
Dept: HOSPITAL 22 - LAB.DROPOF | Age: 82
End: 2022-12-14
Payer: SELF-PAY

## 2022-12-14 ENCOUNTER — HOSPITAL ENCOUNTER (OUTPATIENT)
Dept: HOSPITAL 22 - INF | Age: 82
Discharge: HOME | End: 2022-12-14
Payer: MEDICARE

## 2022-12-14 VITALS — BODY MASS INDEX: 29.8 KG/M2

## 2022-12-14 DIAGNOSIS — T81.49XA: ICD-10-CM

## 2022-12-14 DIAGNOSIS — Z51.81: Primary | ICD-10-CM

## 2022-12-14 DIAGNOSIS — M25.552: ICD-10-CM

## 2022-12-14 DIAGNOSIS — Z45.2: Primary | ICD-10-CM

## 2022-12-14 LAB — VANCOMYCIN TROUGH SERPL-MCNC: 18.5 UG/ML (ref 5–10)

## 2022-12-14 PROCEDURE — C1751 CATH, INF, PER/CENT/MIDLINE: HCPCS

## 2022-12-14 PROCEDURE — 36569 INSJ PICC 5 YR+ W/O IMAGING: CPT

## 2022-12-14 PROCEDURE — 71045 X-RAY EXAM CHEST 1 VIEW: CPT

## 2022-12-14 PROCEDURE — 80202 ASSAY OF VANCOMYCIN: CPT

## 2022-12-14 PROCEDURE — 36410 VNPNXR 3YR/> PHY/QHP DX/THER: CPT

## 2022-12-21 ENCOUNTER — HOSPITAL ENCOUNTER (OUTPATIENT)
Age: 82
End: 2022-12-21
Payer: MEDICARE

## 2022-12-21 VITALS — BODY MASS INDEX: 25.8 KG/M2

## 2022-12-21 DIAGNOSIS — Z45.2: Primary | ICD-10-CM

## 2022-12-21 DIAGNOSIS — T81.49XA: ICD-10-CM

## 2022-12-21 PROCEDURE — C1751 CATH, INF, PER/CENT/MIDLINE: HCPCS

## 2022-12-21 PROCEDURE — 36569 INSJ PICC 5 YR+ W/O IMAGING: CPT

## 2022-12-21 PROCEDURE — 71045 X-RAY EXAM CHEST 1 VIEW: CPT

## 2022-12-22 ENCOUNTER — HOSPITAL ENCOUNTER (OUTPATIENT)
Age: 82
End: 2022-12-22
Payer: MEDICARE

## 2022-12-22 DIAGNOSIS — S72.002A: Primary | ICD-10-CM

## 2022-12-22 LAB — VANCOMYCIN TROUGH SERPL-MCNC: 19.6 UG/ML (ref 5–10)

## 2022-12-22 PROCEDURE — 73502 X-RAY EXAM HIP UNI 2-3 VIEWS: CPT

## 2022-12-22 PROCEDURE — 80202 ASSAY OF VANCOMYCIN: CPT

## 2023-08-15 NOTE — PC.NURSE
LAB CALLED FOR AMMONIA BLOOD DRAW normal,  alert,  in no acute distress,  well developed, well nourished,  normal communication ability